# Patient Record
Sex: FEMALE | Race: WHITE | NOT HISPANIC OR LATINO | URBAN - METROPOLITAN AREA
[De-identification: names, ages, dates, MRNs, and addresses within clinical notes are randomized per-mention and may not be internally consistent; named-entity substitution may affect disease eponyms.]

---
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
## 2020-01-02 ENCOUNTER — EMERGENCY (EMERGENCY)
Facility: HOSPITAL | Age: 20
LOS: 1 days | Discharge: HOME | End: 2020-01-02
Attending: EMERGENCY MEDICINE | Admitting: EMERGENCY MEDICINE
Payer: SELF-PAY

## 2020-01-02 VITALS
TEMPERATURE: 98 F | HEART RATE: 91 BPM | OXYGEN SATURATION: 100 % | SYSTOLIC BLOOD PRESSURE: 112 MMHG | DIASTOLIC BLOOD PRESSURE: 75 MMHG | RESPIRATION RATE: 16 BRPM

## 2020-01-02 DIAGNOSIS — Z98.89 OTHER SPECIFIED POSTPROCEDURAL STATES: Chronic | ICD-10-CM

## 2020-01-02 LAB
APPEARANCE UR: CLEAR — SIGNIFICANT CHANGE UP
BILIRUB UR-MCNC: NEGATIVE — SIGNIFICANT CHANGE UP
BLOOD UR QL VISUAL: NEGATIVE — SIGNIFICANT CHANGE UP
COLOR SPEC: SIGNIFICANT CHANGE UP
GLUCOSE UR-MCNC: NEGATIVE — SIGNIFICANT CHANGE UP
HCG UR-SCNC: NEGATIVE — SIGNIFICANT CHANGE UP
KETONES UR-MCNC: NEGATIVE — SIGNIFICANT CHANGE UP
LEUKOCYTE ESTERASE UR-ACNC: NEGATIVE — SIGNIFICANT CHANGE UP
NITRITE UR-MCNC: NEGATIVE — SIGNIFICANT CHANGE UP
PH UR: 6.5 — SIGNIFICANT CHANGE UP (ref 5–8)
PROT UR-MCNC: NEGATIVE — SIGNIFICANT CHANGE UP
SP GR SPEC: 1.03 — SIGNIFICANT CHANGE UP (ref 1–1.04)
UROBILINOGEN FLD QL: NORMAL — SIGNIFICANT CHANGE UP

## 2020-01-02 PROCEDURE — 99283 EMERGENCY DEPT VISIT LOW MDM: CPT

## 2020-01-02 NOTE — ED ADULT TRIAGE NOTE - CHIEF COMPLAINT QUOTE
Pt. had argument with boyfriend and felt depressed and wanted to cut herself. Pt. with hx of depression, bipolar and anxiety. Pt. has been taking meds on/off.

## 2020-01-03 DIAGNOSIS — F31.9 BIPOLAR DISORDER, UNSPECIFIED: ICD-10-CM

## 2020-01-03 DIAGNOSIS — F60.3 BORDERLINE PERSONALITY DISORDER: ICD-10-CM

## 2020-01-03 DIAGNOSIS — F12.10 CANNABIS ABUSE, UNCOMPLICATED: ICD-10-CM

## 2020-01-03 LAB
AMPHET UR-MCNC: NEGATIVE — SIGNIFICANT CHANGE UP
BARBITURATES UR SCN-MCNC: NEGATIVE — SIGNIFICANT CHANGE UP
BENZODIAZ UR-MCNC: NEGATIVE — SIGNIFICANT CHANGE UP
CANNABINOIDS UR-MCNC: POSITIVE — SIGNIFICANT CHANGE UP
COCAINE METAB.OTHER UR-MCNC: NEGATIVE — SIGNIFICANT CHANGE UP
METHADONE UR-MCNC: NEGATIVE — SIGNIFICANT CHANGE UP
OPIATES UR-MCNC: NEGATIVE — SIGNIFICANT CHANGE UP
OXYCODONE UR-MCNC: NEGATIVE — SIGNIFICANT CHANGE UP
PCP UR-MCNC: NEGATIVE — SIGNIFICANT CHANGE UP

## 2020-01-03 PROCEDURE — 90792 PSYCH DIAG EVAL W/MED SRVCS: CPT | Mod: GC

## 2020-01-03 NOTE — ED BEHAVIORAL HEALTH ASSESSMENT NOTE - RISK ASSESSMENT
Patient is at chronic risk marked by substance abuse, prior suicidality, and recent reported suicide attempt. Her protective factors of ongoing outpatient care and social supports, no access to firearms, future oriented, treatment seeking and currently in treatment right now.  Patient denies acute suicidal ideation, reports chronic passive suicidal ideation, however Low Acute Suicide Risk Patient is at chronic risk marked by substance abuse, prior suicidality, and recent reported suicide attempt. Her protective factors of ongoing outpatient care and social supports, no access to firearms, future oriented, treatment seeking and currently in treatment right now.  Patient denies acute suicidal ideation, reports chronic passive suicidal ideation, patient deferring voluntary psychiatric hospitalization, patient does not meet criteria for involuntary hospitalization. Patient to be discharged and continue outpatient treatment. Pt given information of [facility] center and to call 911/ER should worsening suicidal ideation. Patient is at chronic risk marked by hx of chronic SI, cluster B traits, substance abuse, prior suicidality, and recent aborted suicide attempt. Protective factors of ongoing outpatient care and social supports, no access to firearms, future oriented, treatment seeking and currently in treatment. Patient denies acute suicidal ideation, reports chronic passive suicidal ideation, patient deferring voluntary psychiatric hospitalization, patient does not meet criteria for involuntary hospitalization. Pt given information of [facility] and to call 911/ER should worsening suicidal ideation, patient to follow up with outpatient psychiatrist Dr. Rafi Vizcaino. Patient is at chronic risk marked by hx of chronic SI, cluster B traits, substance abuse, prior suicidality, and recent aborted suicide attempt. Protective factors of ongoing outpatient care and social supports, no access to firearms, future oriented, treatment seeking and currently in treatment. Patient denies acute suicidal ideation, reports chronic passive suicidal ideation, no intent or plan ; patient deferring voluntary psychiatric hospitalization, patient does not meet criteria for involuntary hospitalization. Pt given information of [facility] and to call 911/ER should worsening suicidal ideation, patient to follow up with outpatient psychiatrist Dr. Rafi Vizcaino.

## 2020-01-03 NOTE — ED BEHAVIORAL HEALTH ASSESSMENT NOTE - DESCRIPTION (FIRST USE, LAST USE, QUANTITY, FREQUENCY, DURATION)
vaping every other day History of remote Xanax abuse since age 13 off and on as per the patient in the past

## 2020-01-03 NOTE — ED BEHAVIORAL HEALTH ASSESSMENT NOTE - OTHER
Smith De La Garza CVM [employer] denies delusions, denies suicidal or homicidal ideation missed her last appointment and does not take the Seroquel lately PD

## 2020-01-03 NOTE — ED PROVIDER NOTE - PATIENT PORTAL LINK FT
You can access the FollowMyHealth Patient Portal offered by [facility] by registering at the following website: [email or web address]. By joining [facility]’s FollowMyHealth portal, you will also be able to view your health information using other applications (apps) compatible with our system.

## 2020-01-03 NOTE — ED BEHAVIORAL HEALTH ASSESSMENT NOTE - ORIENTED TO PLACE
Patient with acute exacerbation of asthma with asthmatic bronchitis, most likely due to acute influenza A.   Will place patient on Solumedrol and Duoneb.  Continue Symbicort and Spiriva.   Pulmonary follow up.   Further management as per patient's clinical course. Yes

## 2020-01-03 NOTE — ED PROVIDER NOTE - PROGRESS NOTE DETAILS
Oscar HUBBARD: Pt signed out to me.  She has been evaluated by psychiatry, cleared for discharge home.

## 2020-01-03 NOTE — ED PROVIDER NOTE - CARE PLAN
Principal Discharge DX:	Bipolar affect, depressed  Secondary Diagnosis:	Borderline personality disorder

## 2020-01-03 NOTE — ED BEHAVIORAL HEALTH ASSESSMENT NOTE - SAFETY PLAN ADDT'L DETAILS
Education provided regarding environmental safety / lethal means restriction/Provision of National Suicide Prevention Lifeline 5-419-136-TMCP (2352)/Safety plan discussed with...

## 2020-01-03 NOTE — ED PROVIDER NOTE - CLINICAL SUMMARY MEDICAL DECISION MAKING FREE TEXT BOX
This is a 19 yr old F, pmh anxiety, depression, bipolar disorder, and borderline personality disorder with c/o increased agitation and SI, auditory hallucination. Ua tox and pregnancy profile. Psychiatric consult requested. There is no clinical evidence of intoxication, or any acute medical problem requiring immediate intervention.
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
## 2020-01-03 NOTE — ED PROVIDER NOTE - OBJECTIVE STATEMENT
This is a 19 yr old F, pmh anxiety, depression, bipolar disorder, and borderline personality disorder with c/o increased agitation and SI, auditory hallucination. Bib by ems after fighting with boyfriend. Pt states everything started like the self injuries behavior (cutting)  with her step dad , she "hates him". Endorses today she was irritable and everything was bothering her. She feels need hospitalization because her medication not working for her. Endorses intermittent compliance with medication. Pt reports last psychiatric hospitalization in 2017.

## 2020-01-03 NOTE — ED BEHAVIORAL HEALTH ASSESSMENT NOTE - SUICIDE RISK FACTORS
Alcohol/Substance abuse disorders/Impulsivity/History of abuse/trauma/Cluster B Personality disorders or traits current/past

## 2020-01-03 NOTE — ED BEHAVIORAL HEALTH ASSESSMENT NOTE - REFERRAL / APPOINTMENT DETAILS
Patient instructed to make appointment with outpatient psychiatrist Dr. Vizcaino, Contacted outpatient psychiatrist, pt has appointment at [facility] on 1/3/19 at 1pm with Dr. Rafi Vizcaino

## 2020-01-03 NOTE — ED BEHAVIORAL HEALTH ASSESSMENT NOTE - SUICIDE PROTECTIVE FACTORS
Supportive social network of family or friends/Positive therapeutic relationships/Responsibility to family and others/Identifies reasons for living/Has future plans
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
## 2020-01-03 NOTE — ED BEHAVIORAL HEALTH ASSESSMENT NOTE - TREATMENT
[facility]

## 2020-01-03 NOTE — ED BEHAVIORAL HEALTH ASSESSMENT NOTE - OTHER PAST PSYCHIATRIC HISTORY (INCLUDE DETAILS REGARDING ONSET, COURSE OF ILLNESS, INPATIENT/OUTPATIENT TREATMENT)
Inpatient: Hx of multiple psychiatric hospitalizations, reports at least 6. Last impatient in August 2015 for worsening mood symptoms and increased substance abuse, April 2015 at [facility] s/p suicide ideation with plans of suffocating her self with bag and March 2014 after suicide ideation with plan to hang herself. She had bought rope and mom found it in closet.     Outpatient:  Current psychiatrist: Dr. Charis DOVER [facility]    Current therapist: does not have one currently Inpatient: Hx of multiple psychiatric hospitalizations, reports at least 6. Last impatient in August 2015 for worsening mood symptoms and increased substance abuse, April 2015 at [facility] s/p suicide ideation with plans of suffocating her self with bag and March 2014 after suicide ideation with plan to hang herself. She had bought rope and mom found it in closet. She [facility] due to PCP referral "for being emotionally unstable", missed her f/u appointment ; [facility] in [facility], pt states that she is referred 'to adult outpatient clinic now"      Outpatient:  Current psychiatrist: Dr. Charis [facility]    Current therapist: does not have one currently

## 2020-01-03 NOTE — ED ADULT NURSE REASSESSMENT NOTE - NS ED NURSE REASSESS COMMENT FT1
Pt sitting on chairs, calm and cooperative, denies SI/HI at the moment. Respirations even/unlabored, nad noted. will continue to monitor

## 2020-01-03 NOTE — ED BEHAVIORAL HEALTH ASSESSMENT NOTE - DESCRIPTION
Pt calm and cooperative.     Vital Signs Last 24 Hrs  T(C): 36.8 (02 Jan 2020 22:19), Max: 36.8 (02 Jan 2020 22:19)  T(F): 98.2 (02 Jan 2020 22:19), Max: 98.2 (02 Jan 2020 22:19)  HR: 91 (02 Jan 2020 22:19) (91 - 91)  BP: 112/75 (02 Jan 2020 22:19) (112/75 - 112/75)  BP(mean): --  RR: 16 (02 Jan 2020 22:19) (16 - 16)  SpO2: 100% (02 Jan 2020 22:19) (100% - 100%) Denies, no known drug allergies Lives with boyfriend and friend currently. Currently unemployed. Dropped out of college, planning on becoming a nurse

## 2020-01-03 NOTE — ED BEHAVIORAL HEALTH ASSESSMENT NOTE - SUMMARY
Patient does not present with any suicidal, homicidal, depression, or self-harm behaviors.  She also does not present with any psychotic or manic symptoms.  There is no acute reason to hospitalize the patient and she will be discharged home. 18yo F domiciled with boyfriend, dropped out of [employer] spring 2019, unemployed, with history of bipolar disorder and r/o borderline personality disorder, no known medical problems, Hx of SIB, history of cannabis substance use, daily vaping nicotine, remote history of alcohol and BNZ abuse, currently in treatment [facility] with Dr. Vizcaino (last seen on 12/4/19), hx of at least 6 inpatient psychiatric hospitalizations (most recent in 2015), with history of several suicide attempts (aborted attempt via hanging 11/20/19), who presents today on referral from new primary care doctor who presents to the ED after being BIB EMS activated by self for suicidal ideation.     Patient reports that she impulsively called 911 and currently denies having any suicidal thoughts. Reports that she had urges to self-harm after argument with boyfriend and made phone call to 911 in retaliation. Feels remorseful and regretful at time. Patient does not present with any suicidal, homicidal, depression, or self-harm behaviors.  She also does not present with any psychotic or manic symptoms. Patient does not require inpatient hospitalization at this time, able to safety plan and discharged home with close outpatient follow up. 18yo F domiciled with boyfriend, dropped out of [employer] spring 2019, unemployed, with history of bipolar disorder and r/o borderline personality disorder, no known medical problems, Hx of SIB, history of cannabis substance use, daily vaping nicotine, remote history of alcohol and BNZ abuse, currently in treatment [facility] with Dr. Vizcaino (last seen on 12/4/19), hx of at least 6 inpatient psychiatric hospitalizations (most recent in 2015), with history of several suicide attempts (aborted attempt via hanging 11/20/19), who presents today on referral from new primary care doctor who presents to the ED after being BIB EMS activated by self for suicidal ideation.     Patient reports that she impulsively called 911 and currently denies having any suicidal thoughts. Reports that she had urges to self-harm after argument with boyfriend and made phone call to 911 in retaliation. Feels remorseful and regretful at time. Patient does not present with any suicidal, homicidal, depression, or self-harm behaviors.  She also does not present with any psychotic or manic symptoms. Denies any urges to cut herself at present time. Denies feeling depressed . She wants to be d/ramakrishna. Patient does not meet criteria for involuntary hospitalization, able to safety plan and discharged home with close outpatient follow up.

## 2020-01-04 NOTE — ED BEHAVIORAL HEALTH NOTE - BEHAVIORAL HEALTH NOTE
Per High Risk Log, SW attempted to contact patient at [phone] two times and received message both times that "The number you have dialed is not a working number".

## 2020-01-05 NOTE — ED BEHAVIORAL HEALTH NOTE - BEHAVIORAL HEALTH NOTE
Per high risk log, SW attempted to contact patient at [phone] received message that "The number you have dialed is not a working number"

## 2020-07-29 ENCOUNTER — EMERGENCY (EMERGENCY)
Age: 20
LOS: 1 days | Discharge: HOME | End: 2020-07-29
Attending: EMERGENCY MEDICINE | Admitting: EMERGENCY MEDICINE
Payer: COMMERCIAL

## 2020-07-29 VITALS
HEART RATE: 120 BPM | DIASTOLIC BLOOD PRESSURE: 70 MMHG | SYSTOLIC BLOOD PRESSURE: 112 MMHG | TEMPERATURE: 99 F | RESPIRATION RATE: 24 BRPM | OXYGEN SATURATION: 98 %

## 2020-07-29 VITALS
HEART RATE: 99 BPM | SYSTOLIC BLOOD PRESSURE: 115 MMHG | OXYGEN SATURATION: 100 % | RESPIRATION RATE: 16 BRPM | TEMPERATURE: 98 F | DIASTOLIC BLOOD PRESSURE: 68 MMHG

## 2020-07-29 DIAGNOSIS — Z98.89 OTHER SPECIFIED POSTPROCEDURAL STATES: Chronic | ICD-10-CM

## 2020-07-29 LAB
APPEARANCE UR: SIGNIFICANT CHANGE UP
BACTERIA # UR AUTO: HIGH
BILIRUB UR-MCNC: NEGATIVE — SIGNIFICANT CHANGE UP
BLOOD UR QL VISUAL: HIGH
COLOR SPEC: YELLOW — SIGNIFICANT CHANGE UP
GLUCOSE UR-MCNC: NEGATIVE — SIGNIFICANT CHANGE UP
HYALINE CASTS # UR AUTO: HIGH
KETONES UR-MCNC: SIGNIFICANT CHANGE UP
LEUKOCYTE ESTERASE UR-ACNC: SIGNIFICANT CHANGE UP
NITRITE UR-MCNC: POSITIVE — HIGH
PH UR: 6 — SIGNIFICANT CHANGE UP (ref 5–8)
PROT UR-MCNC: 50 — SIGNIFICANT CHANGE UP
RBC CASTS # UR COMP ASSIST: SIGNIFICANT CHANGE UP (ref 0–?)
SP GR SPEC: 1.03 — SIGNIFICANT CHANGE UP (ref 1–1.04)
SQUAMOUS # UR AUTO: SIGNIFICANT CHANGE UP
UROBILINOGEN FLD QL: NORMAL — SIGNIFICANT CHANGE UP
WBC UR QL: HIGH (ref 0–?)

## 2020-07-29 PROCEDURE — 99283 EMERGENCY DEPT VISIT LOW MDM: CPT
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
## 2020-07-29 NOTE — ED PROVIDER NOTE - PHYSICAL EXAMINATION
GA: tearful any time she speaks in no acute distress  Skin: multiple bruises in her legs, old abrasions on bilat inner lower legs which she states is from a fall a few days ago.   Multiple 4 cm well healed scars on L forearm. GA: tearful any time she speaks in no acute distress  Skin: multiple bruises in her legs, old abrasions on bilat inner lower legs which she states is from a fall a few days ago.   Multiple 4 cm well healed scars on L forearm. Abrasion to back of shoulder on the R side and bruising. GA: tearful any time she speaks in no acute distress  Skin: multiple bruises in her legs, old abrasions on bilat inner lower legs which she states is from a fall a few days ago.   Multiple 4 cm well healed scars on L forearm. Abrasion to back of shoulder on the R side and bruising.  ATTENDING PHYSICAL EXAM  GEN - Upset about being in ED.  Answers only few questions in HPI with repeated encouragement  HEAD - NC/AT  NECK: Neck supple  PULMONARY - CTA b/l, symmetric breath sounds  CARDIAC -s1s2, RRR, no M,R,G  ABDOMEN - +BS, ND, NT  BACK - no CVA tenderness  EXTREMITIES - noted multiple areas of abrasions and ecchymosis on b/l upper and lower extremities.  Patient not allowing close examination.
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
## 2020-07-29 NOTE — ED PROVIDER NOTE - NSFOLLOWUPINSTRUCTIONS_ED_ALL_ED_FT
Please follow up tomorrow if you would like any further examination. Follow all instructions as discussed verbally.
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
## 2020-07-30 ENCOUNTER — EMERGENCY (EMERGENCY)
Facility: HOSPITAL | Age: 20
LOS: 1 days | Discharge: HOME | End: 2020-07-30
Attending: EMERGENCY MEDICINE | Admitting: EMERGENCY MEDICINE
Payer: COMMERCIAL

## 2020-07-30 VITALS
DIASTOLIC BLOOD PRESSURE: 74 MMHG | RESPIRATION RATE: 16 BRPM | HEART RATE: 75 BPM | OXYGEN SATURATION: 100 % | TEMPERATURE: 98 F | SYSTOLIC BLOOD PRESSURE: 130 MMHG

## 2020-07-30 VITALS
TEMPERATURE: 97 F | HEART RATE: 113 BPM | DIASTOLIC BLOOD PRESSURE: 83 MMHG | SYSTOLIC BLOOD PRESSURE: 123 MMHG | OXYGEN SATURATION: 100 % | RESPIRATION RATE: 16 BRPM

## 2020-07-30 DIAGNOSIS — Z98.89 OTHER SPECIFIED POSTPROCEDURAL STATES: Chronic | ICD-10-CM

## 2020-07-30 LAB
ALBUMIN SERPL ELPH-MCNC: 4.4 G/DL — SIGNIFICANT CHANGE UP (ref 3.3–5)
ALP SERPL-CCNC: 85 U/L — SIGNIFICANT CHANGE UP (ref 40–120)
ALT FLD-CCNC: 26 U/L — SIGNIFICANT CHANGE UP (ref 4–33)
ANION GAP SERPL CALC-SCNC: 14 MMO/L — SIGNIFICANT CHANGE UP (ref 7–14)
AST SERPL-CCNC: 34 U/L — HIGH (ref 4–32)
BILIRUB SERPL-MCNC: 0.6 MG/DL — SIGNIFICANT CHANGE UP (ref 0.2–1.2)
BUN SERPL-MCNC: 10 MG/DL — SIGNIFICANT CHANGE UP (ref 7–23)
CALCIUM SERPL-MCNC: 8.8 MG/DL — SIGNIFICANT CHANGE UP (ref 8.4–10.5)
CHLORIDE SERPL-SCNC: 103 MMOL/L — SIGNIFICANT CHANGE UP (ref 98–107)
CO2 SERPL-SCNC: 23 MMOL/L — SIGNIFICANT CHANGE UP (ref 22–31)
CREAT SERPL-MCNC: 0.6 MG/DL — SIGNIFICANT CHANGE UP (ref 0.5–1.3)
GLUCOSE SERPL-MCNC: 97 MG/DL — SIGNIFICANT CHANGE UP (ref 70–99)
HBV SURFACE AG SER-ACNC: NEGATIVE — SIGNIFICANT CHANGE UP
HCG SERPL-ACNC: < 5 MIU/ML — SIGNIFICANT CHANGE UP
HCT VFR BLD CALC: 41.3 % — SIGNIFICANT CHANGE UP (ref 34.5–45)
HGB BLD-MCNC: 13.2 G/DL — SIGNIFICANT CHANGE UP (ref 11.5–15.5)
HIV COMBO RESULT: SIGNIFICANT CHANGE UP
HIV1+2 AB SPEC QL: SIGNIFICANT CHANGE UP
MCHC RBC-ENTMCNC: 28.5 PG — SIGNIFICANT CHANGE UP (ref 27–34)
MCHC RBC-ENTMCNC: 32 % — SIGNIFICANT CHANGE UP (ref 32–36)
MCV RBC AUTO: 89.2 FL — SIGNIFICANT CHANGE UP (ref 80–100)
NRBC # FLD: 0 K/UL — SIGNIFICANT CHANGE UP (ref 0–0)
PLATELET # BLD AUTO: 266 K/UL — SIGNIFICANT CHANGE UP (ref 150–400)
PMV BLD: 10 FL — SIGNIFICANT CHANGE UP (ref 7–13)
POTASSIUM SERPL-MCNC: 3.4 MMOL/L — LOW (ref 3.5–5.3)
POTASSIUM SERPL-SCNC: 3.4 MMOL/L — LOW (ref 3.5–5.3)
PROT SERPL-MCNC: 7.2 G/DL — SIGNIFICANT CHANGE UP (ref 6–8.3)
RBC # BLD: 4.63 M/UL — SIGNIFICANT CHANGE UP (ref 3.8–5.2)
RBC # FLD: 12.3 % — SIGNIFICANT CHANGE UP (ref 10.3–14.5)
SODIUM SERPL-SCNC: 140 MMOL/L — SIGNIFICANT CHANGE UP (ref 135–145)
WBC # BLD: 9.14 K/UL — SIGNIFICANT CHANGE UP (ref 3.8–10.5)
WBC # FLD AUTO: 9.14 K/UL — SIGNIFICANT CHANGE UP (ref 3.8–10.5)

## 2020-07-30 PROCEDURE — 99283 EMERGENCY DEPT VISIT LOW MDM: CPT

## 2020-07-30 RX ORDER — AZITHROMYCIN 500 MG/1
1000 TABLET, FILM COATED ORAL ONCE
Refills: 0 | Status: COMPLETED | OUTPATIENT
Start: 2020-07-30 | End: 2020-07-30

## 2020-07-30 RX ORDER — CEFTRIAXONE 500 MG/1
250 INJECTION, POWDER, FOR SOLUTION INTRAMUSCULAR; INTRAVENOUS ONCE
Refills: 0 | Status: COMPLETED | OUTPATIENT
Start: 2020-07-30 | End: 2020-07-30

## 2020-07-30 RX ADMIN — CEFTRIAXONE 250 MILLIGRAM(S): 500 INJECTION, POWDER, FOR SOLUTION INTRAMUSCULAR; INTRAVENOUS at 21:33

## 2020-07-30 RX ADMIN — AZITHROMYCIN 1000 MILLIGRAM(S): 500 TABLET, FILM COATED ORAL at 21:33
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
## 2020-07-30 NOTE — ED PROVIDER NOTE - PROGRESS NOTE DETAILS
Lynda Sen MD: SW spoke to pt regarding transport home, pt willing to take Uber ride home. Lynda Sen MD: SANE RN arrived to ED. States that pt requesting for STD ppx. Will give IM ceftriaxone and PO azithromycin. Cynthia HBUBARD PGY-5:  Received signout on patient. Seen by sane nurse. samples taken. meds will be given and patient is stable for dc home.

## 2020-07-30 NOTE — PROVIDER CONTACT NOTE (OTHER) - ASSESSMENT
I was asked by Provider to see pt regarding Insurance and transportation . Met with pt, she is alert, oriented x3. Pt asked me if her Insurance is active. I checked with the registration and was told  she has BC/BS active. Notified pt. Then she said she will take Uber to go home. No further SW services was requested.

## 2020-07-30 NOTE — ED ADULT NURSE NOTE - IN THE PAST 12 MONTHS HAVE YOU USED DRUGS OTHER THAN THOSE REQUIRED FOR MEDICAL REASON?
Mom called and said the prescription date is wrong and needs it to be changed from April 21 to February 21  Please give her a call when this is completed No
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
## 2020-07-30 NOTE — ED PROVIDER NOTE - PATIENT PORTAL LINK FT
You can access the FollowMyHealth Patient Portal offered by [facility] by registering at the following website: [email or web address]. By joining [employer]’s FollowMyHealth portal, you will also be able to view your health information using other applications (apps) compatible with our system.
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
## 2020-07-30 NOTE — ED ADULT NURSE NOTE - HIV OFFER
I have attempted without success to contact this patient by phone lvm   Previously Declined (within the last year)
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
## 2020-07-30 NOTE — ED PROVIDER NOTE - PHYSICAL EXAMINATION
CONSTITUTIONAL: Nontoxic, anxious appearing female  HEAD: Normocephalic; atraumatic  EYES: Normal inspection, EOMI  ENMT: External appears normal  NECK: Supple  CARD: RRR; no audible murmurs, rubs, or gallops  RESP: No respiratory distress, lungs ctab/l  ABD: Soft, non-distended; non-tender; no rebound or guarding  EXT: No LE pitting edema or calf tenderness; distal pulses intact with good capillary refill  SKIN: Warm, dry, intact  NEURO: aaox3, moving all extremities spontaneously  : deferred to GERSON MCQUEEN

## 2020-07-30 NOTE — ED ADULT NURSE REASSESSMENT NOTE - NS ED NURSE REASSESS COMMENT FT1
GERSON RN at bedside. medication orders in place. [place] [employer]  Alison called, left phone number [phone].

## 2020-07-31 LAB
C TRACH RRNA SPEC QL NAA+PROBE: SIGNIFICANT CHANGE UP
CULTURE RESULTS: SIGNIFICANT CHANGE UP
HAV IGM SER-ACNC: NONREACTIVE — SIGNIFICANT CHANGE UP
HBV CORE AB SER-ACNC: NONREACTIVE — SIGNIFICANT CHANGE UP
HBV CORE IGM SER-ACNC: NONREACTIVE — SIGNIFICANT CHANGE UP
HBV SURFACE AB SER-ACNC: NONREACTIVE — SIGNIFICANT CHANGE UP
HBV SURFACE AG SER-ACNC: NONREACTIVE — SIGNIFICANT CHANGE UP
HCV AB S/CO SERPL IA: 0.22 S/CO — SIGNIFICANT CHANGE UP (ref 0–0.99)
HCV AB SERPL-IMP: SIGNIFICANT CHANGE UP
N GONORRHOEA RRNA SPEC QL NAA+PROBE: SIGNIFICANT CHANGE UP
SPECIMEN SOURCE: SIGNIFICANT CHANGE UP
T PALLIDUM AB TITR SER: NEGATIVE — SIGNIFICANT CHANGE UP

## 2020-08-02 NOTE — ED POST DISCHARGE NOTE - RESULT SUMMARY
culture grew 3 or more types of organisms  which indicate collection contamination, consider recollection only if clinically indicated. No antibiotic listed in ED provider note or prescription writer at time of discharge. UA Positive. Patient contact # [phone] " person not accepting calls at this time call again later". Call Back  PA to continue to try and contact patient.

## 2020-11-16 ENCOUNTER — EMERGENCY (EMERGENCY)
Facility: HOSPITAL | Age: 20
LOS: 1 days | Discharge: HOME | End: 2020-11-16
Attending: STUDENT IN AN ORGANIZED HEALTH CARE EDUCATION/TRAINING PROGRAM | Admitting: STUDENT IN AN ORGANIZED HEALTH CARE EDUCATION/TRAINING PROGRAM
Payer: COMMERCIAL

## 2020-11-16 VITALS
RESPIRATION RATE: 18 BRPM | DIASTOLIC BLOOD PRESSURE: 63 MMHG | OXYGEN SATURATION: 100 % | SYSTOLIC BLOOD PRESSURE: 117 MMHG | HEART RATE: 99 BPM

## 2020-11-16 VITALS
RESPIRATION RATE: 16 BRPM | DIASTOLIC BLOOD PRESSURE: 70 MMHG | HEART RATE: 120 BPM | TEMPERATURE: 99 F | OXYGEN SATURATION: 100 % | SYSTOLIC BLOOD PRESSURE: 126 MMHG

## 2020-11-16 DIAGNOSIS — Z98.89 OTHER SPECIFIED POSTPROCEDURAL STATES: Chronic | ICD-10-CM

## 2020-11-16 PROCEDURE — 99283 EMERGENCY DEPT VISIT LOW MDM: CPT

## 2020-11-16 RX ORDER — LIDOCAINE 4 G/100G
1 CREAM TOPICAL ONCE
Refills: 0 | Status: COMPLETED | OUTPATIENT
Start: 2020-11-16 | End: 2020-11-16

## 2020-11-16 RX ORDER — ACETAMINOPHEN 500 MG
650 TABLET ORAL ONCE
Refills: 0 | Status: COMPLETED | OUTPATIENT
Start: 2020-11-16 | End: 2020-11-16

## 2020-11-16 RX ADMIN — LIDOCAINE 1 PATCH: 4 CREAM TOPICAL at 16:04

## 2020-11-16 RX ADMIN — Medication 650 MILLIGRAM(S): at 16:04

## 2020-11-16 NOTE — ED PROVIDER NOTE - PATIENT PORTAL LINK FT
You can access the FollowMyHealth Patient Portal offered by [facility] by registering at the following website: [email or web address]. By joining [employer]’s FollowMyHealth portal, you will also be able to view your health information using other applications (apps) compatible with our system.
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
## 2020-11-16 NOTE — ED PROVIDER NOTE - NSFOLLOWUPINSTRUCTIONS_ED_ALL_ED_FT
Back Pain    Back pain is very common in adults. The cause of back pain is rarely dangerous and the pain often gets better over time. The cause of your back pain may not be known and may include strain of muscles or ligaments, degeneration of the spinal disks, or arthritis. Occasionally the pain may radiate down your leg(s). Over-the-counter medicines to reduce pain and inflammation are often the most helpful. Stretching and remaining active frequently helps the healing process.     Low Back Strain  A strain is a stretch or tear in a muscle or the strong cords of tissue that attach muscle to bone (tendons). Strains of the lower back (lumbar spine) are a common cause of low back pain. A strain occurs when muscles or tendons are torn or are stretched beyond their limits. The muscles may become inflamed, resulting in pain and sudden muscle tightening (spasms). A strain can happen suddenly due to an injury (trauma), or it can develop gradually due to overuse.    What increases the risk?  The following factors may increase your risk of getting this condition:  Playing contact sports.  Participating in sports or activities that put excessive stress on the back and require a lot of bending and twisting, including:  Lifting weights or heavy objects, Gymnastics, Soccer, Figure skating, Snowboarding, Being overweight or obese, Having poor strength and flexibility.    What are the signs or symptoms?  Symptoms of this condition may include:  Sharp or dull pain in the lower back that does not go away. Pain may extend to the buttocks.  Stiffness.  Limited range of motion.  Inability to stand up straight due to stiffness or pain.  Muscle spasms.    How is this diagnosed?  This condition may be diagnosed based on:  Your symptoms, Your medical history, A physical exam, Your health care provider may push on certain areas of your back to determine the source of your pain. You may be asked to bend forward, backward, and side to side to assess the severity of your pain and your range of motion.  Imaging tests, such as:  X-rays, MRI.    How is this treated?  Treatment for this condition may include:  Applying heat and cold to the affected area.  Medicines to help relieve pain and to relax your muscles (muscle relaxants).  NSAIDs to help reduce swelling and discomfort.  Physical therapy.  When your symptoms improve, it is important to gradually return to your normal routine as soon as possible to reduce pain, avoid stiffness, and avoid loss of muscle strength. Generally, symptoms should improve within 6 weeks of treatment. However, recovery time varies.    Follow these instructions at home:  Managing pain, stiffness, and swelling     If directed, apply ice to the injured area during the first 24 hours after your injury.  Put ice in a plastic bag.  Place a towel between your skin and the bag.  Leave the ice on for 20 minutes, 2–3 times a day.  If directed, apply heat to the affected area as often as told by your health care provider. Use the heat source that your health care provider recommends, such as a moist heat pack or a heating pad.  Place a towel between your skin and the heat source.  Leave the heat on for 20–30 minutes.  Remove the heat if your skin turns bright red. This is especially important if you are unable to feel pain, heat, or cold. You may have a greater risk of getting burned.  Activity     Rest and return to your normal activities as told by your health care provider. Ask your health care provider what activities are safe for you.  Avoid activities that take a lot of effort (are strenuous) for as long as told by your health care provider.  Do exercises as told by your health care provider.  General instructions     Take over-the-counter and prescription medicines only as told by your health care provider.  If you have questions or concerns about safety while taking pain medicine, talk with your health care provider.  Do not drive or operate heavy machinery until you know how your pain medicine affects you.  Do not use any tobacco products, such as cigarettes, chewing tobacco, and e-cigarettes. Tobacco can delay bone healing. If you need help quitting, ask your health care provider.  Keep all follow-up visits as told by your health care provider. This is important.    How is this prevented?  Warm up and stretch before being active.  Cool down and stretch after being active.  Give your body time to rest between periods of activity.  Avoid:  Being physically inactive for long periods at a time.  Exercising or playing sports when you are tired or in pain.  Use correct form when playing sports and lifting heavy objects.  Use good posture when sitting and standing.  Maintain a healthy weight.  Sleep on a mattress with medium firmness to support your back.  Make sure to use equipment that fits you, including shoes that fit well.  Be safe and responsible while being active to avoid falls.  Do at least 150 minutes of moderate-intensity exercise each week, such as brisk walking or water aerobics. Try a form of exercise that takes stress off your back, such as swimming or stationary cycling.  Maintain physical fitness, including:  Strength.  Flexibility.  Cardiovascular fitness.  Endurance.  Contact a health care provider if:  Your back pain does not improve after 6 weeks of treatment.  Your symptoms get worse.  Get help right away if:  Your back pain is severe.  You are unable to stand or walk.  You develop pain in your legs.  You develop weakness in your buttocks or legs.  You have difficulty controlling when you urinate or when you have a bowel movement.  This information is not intended to replace advice given to you by your health care provider. Make sure you discuss any questions you have with your health care provider.      SEEK IMMEDIATE MEDICAL CARE IF YOU HAVE ANY OF THE FOLLOWING SYMPTOMS: bowel or bladder control problems, unusual weakness or numbness in your arms or legs, nausea or vomiting, abdominal pain, fever, dizziness/lightheadedness.

## 2020-11-16 NOTE — ED ADULT NURSE NOTE - OBJECTIVE STATEMENT
break RN - pt received in intake A&Ox4 c/o fall. Pt states she was walking down steps quickly and was wearing socks and slipped down multiple steps. Denies LOC, states she "thinks" she hit head. Small abrasion noted on back. No bleeding noted. C/o R elbow and R lower back pain. Denies SOB or trouble breathing, ambulatory but difficulty d/t pain. Medicated as per EMAR. Will continue to monitor.

## 2020-11-16 NOTE — ED PROVIDER NOTE - PROGRESS NOTE DETAILS
Patient has mild relief of pain after pain medication. Patient no longer tachycardiac; 93 on monitor

## 2020-11-16 NOTE — ED PROVIDER NOTE - ATTENDING CONTRIBUTION TO CARE
Oscar HUBBARD: I agree with the above provided history and exam     I Kee Moore MD performed a history and physical exam of the patient and discussed their management with the resident and /or advanced care provider. I reviewed the resident and /or ACP's note and agree with the documented findings and plan of care. My medical decision making and observations are found above.

## 2020-11-16 NOTE — ED PROVIDER NOTE - CLINICAL SUMMARY MEDICAL DECISION MAKING FREE TEXT BOX
20F w/ back pain s/p mechanical slip and fall down several stairs - low mechanism, exam w/out midline spinal tenderness, no red flags for spinal injury, will give pain meds, revital, reassess

## 2021-02-09 ENCOUNTER — OUTPATIENT (OUTPATIENT)
Dept: OUTPATIENT SERVICES | Facility: HOSPITAL | Age: 21
LOS: 1 days | Discharge: STILL ADMITTED OR ADMINISTRATIVE | End: 2021-02-09

## 2021-02-09 DIAGNOSIS — Z98.89 OTHER SPECIFIED POSTPROCEDURAL STATES: Chronic | ICD-10-CM

## 2021-02-09 PROCEDURE — 90839 PSYTX CRISIS INITIAL 60 MIN: CPT

## 2021-03-15 ENCOUNTER — OUTPATIENT (OUTPATIENT)
Dept: OUTPATIENT SERVICES | Facility: HOSPITAL | Age: 21
LOS: 1 days | Discharge: LEFT BEFORE DISCHARGE | End: 2021-03-15
Payer: COMMERCIAL

## 2021-03-15 DIAGNOSIS — Z98.89 OTHER SPECIFIED POSTPROCEDURAL STATES: Chronic | ICD-10-CM

## 2021-03-25 DIAGNOSIS — F43.10 POST-TRAUMATIC STRESS DISORDER, UNSPECIFIED: ICD-10-CM

## 2021-03-25 DIAGNOSIS — F31.9 BIPOLAR DISORDER, UNSPECIFIED: ICD-10-CM

## 2022-02-06 ENCOUNTER — EMERGENCY (EMERGENCY)
Facility: HOSPITAL | Age: 22
LOS: 1 days | Discharge: HOME | End: 2022-02-06
Attending: EMERGENCY MEDICINE | Admitting: EMERGENCY MEDICINE
Payer: COMMERCIAL

## 2022-02-06 VITALS
TEMPERATURE: 97 F | HEIGHT: 62 IN | OXYGEN SATURATION: 100 % | DIASTOLIC BLOOD PRESSURE: 75 MMHG | RESPIRATION RATE: 18 BRPM | SYSTOLIC BLOOD PRESSURE: 122 MMHG | HEART RATE: 81 BPM

## 2022-02-06 VITALS
HEART RATE: 66 BPM | RESPIRATION RATE: 18 BRPM | SYSTOLIC BLOOD PRESSURE: 109 MMHG | DIASTOLIC BLOOD PRESSURE: 71 MMHG | OXYGEN SATURATION: 100 %

## 2022-02-06 DIAGNOSIS — Z98.89 OTHER SPECIFIED POSTPROCEDURAL STATES: Chronic | ICD-10-CM

## 2022-02-06 LAB
ALBUMIN SERPL ELPH-MCNC: 4.9 G/DL — SIGNIFICANT CHANGE UP (ref 3.3–5)
ALP SERPL-CCNC: 68 U/L — SIGNIFICANT CHANGE UP (ref 40–120)
ALT FLD-CCNC: 17 U/L — SIGNIFICANT CHANGE UP (ref 4–33)
ANION GAP SERPL CALC-SCNC: 16 MMOL/L — HIGH (ref 7–14)
APPEARANCE UR: ABNORMAL
AST SERPL-CCNC: 23 U/L — SIGNIFICANT CHANGE UP (ref 4–32)
BACTERIA # UR AUTO: ABNORMAL
BASOPHILS # BLD AUTO: 0.08 K/UL — SIGNIFICANT CHANGE UP (ref 0–0.2)
BASOPHILS NFR BLD AUTO: 0.7 % — SIGNIFICANT CHANGE UP (ref 0–2)
BILIRUB SERPL-MCNC: 0.7 MG/DL — SIGNIFICANT CHANGE UP (ref 0.2–1.2)
BILIRUB UR-MCNC: NEGATIVE — SIGNIFICANT CHANGE UP
BUN SERPL-MCNC: 19 MG/DL — SIGNIFICANT CHANGE UP (ref 7–23)
CALCIUM SERPL-MCNC: 10.2 MG/DL — SIGNIFICANT CHANGE UP (ref 8.4–10.5)
CHLORIDE SERPL-SCNC: 97 MMOL/L — LOW (ref 98–107)
CO2 SERPL-SCNC: 24 MMOL/L — SIGNIFICANT CHANGE UP (ref 22–31)
COLOR SPEC: YELLOW — SIGNIFICANT CHANGE UP
CREAT SERPL-MCNC: 0.75 MG/DL — SIGNIFICANT CHANGE UP (ref 0.5–1.3)
DIFF PNL FLD: ABNORMAL
EOSINOPHIL # BLD AUTO: 0.02 K/UL — SIGNIFICANT CHANGE UP (ref 0–0.5)
EOSINOPHIL NFR BLD AUTO: 0.2 % — SIGNIFICANT CHANGE UP (ref 0–6)
EPI CELLS # UR: 4 /HPF — SIGNIFICANT CHANGE UP (ref 0–5)
GLUCOSE SERPL-MCNC: 94 MG/DL — SIGNIFICANT CHANGE UP (ref 70–99)
GLUCOSE UR QL: NEGATIVE — SIGNIFICANT CHANGE UP
HCT VFR BLD CALC: 43.8 % — SIGNIFICANT CHANGE UP (ref 34.5–45)
HGB BLD-MCNC: 15 G/DL — SIGNIFICANT CHANGE UP (ref 11.5–15.5)
IANC: 7.76 K/UL — SIGNIFICANT CHANGE UP (ref 1.5–8.5)
IMM GRANULOCYTES NFR BLD AUTO: 0.4 % — SIGNIFICANT CHANGE UP (ref 0–1.5)
KETONES UR-MCNC: ABNORMAL
LEUKOCYTE ESTERASE UR-ACNC: ABNORMAL
LIDOCAIN IGE QN: 24 U/L — SIGNIFICANT CHANGE UP (ref 7–60)
LYMPHOCYTES # BLD AUTO: 2.59 K/UL — SIGNIFICANT CHANGE UP (ref 1–3.3)
LYMPHOCYTES # BLD AUTO: 22.5 % — SIGNIFICANT CHANGE UP (ref 13–44)
MCHC RBC-ENTMCNC: 29.2 PG — SIGNIFICANT CHANGE UP (ref 27–34)
MCHC RBC-ENTMCNC: 34.2 GM/DL — SIGNIFICANT CHANGE UP (ref 32–36)
MCV RBC AUTO: 85.2 FL — SIGNIFICANT CHANGE UP (ref 80–100)
MONOCYTES # BLD AUTO: 1.02 K/UL — HIGH (ref 0–0.9)
MONOCYTES NFR BLD AUTO: 8.9 % — SIGNIFICANT CHANGE UP (ref 2–14)
NEUTROPHILS # BLD AUTO: 7.76 K/UL — HIGH (ref 1.8–7.4)
NEUTROPHILS NFR BLD AUTO: 67.3 % — SIGNIFICANT CHANGE UP (ref 43–77)
NITRITE UR-MCNC: NEGATIVE — SIGNIFICANT CHANGE UP
NRBC # BLD: 0 /100 WBCS — SIGNIFICANT CHANGE UP
NRBC # FLD: 0 K/UL — SIGNIFICANT CHANGE UP
PH UR: 6.5 — SIGNIFICANT CHANGE UP (ref 5–8)
PLATELET # BLD AUTO: 313 K/UL — SIGNIFICANT CHANGE UP (ref 150–400)
POTASSIUM SERPL-MCNC: 3.7 MMOL/L — SIGNIFICANT CHANGE UP (ref 3.5–5.3)
POTASSIUM SERPL-SCNC: 3.7 MMOL/L — SIGNIFICANT CHANGE UP (ref 3.5–5.3)
PROT SERPL-MCNC: 8.3 G/DL — SIGNIFICANT CHANGE UP (ref 6–8.3)
PROT UR-MCNC: ABNORMAL
RBC # BLD: 5.14 M/UL — SIGNIFICANT CHANGE UP (ref 3.8–5.2)
RBC # FLD: 12.3 % — SIGNIFICANT CHANGE UP (ref 10.3–14.5)
SODIUM SERPL-SCNC: 137 MMOL/L — SIGNIFICANT CHANGE UP (ref 135–145)
SP GR SPEC: 1.04 — SIGNIFICANT CHANGE UP (ref 1–1.05)
UROBILINOGEN FLD QL: SIGNIFICANT CHANGE UP
WBC # BLD: 11.52 K/UL — HIGH (ref 3.8–10.5)
WBC # FLD AUTO: 11.52 K/UL — HIGH (ref 3.8–10.5)
WBC UR QL: 2 /HPF — SIGNIFICANT CHANGE UP (ref 0–5)

## 2022-02-06 PROCEDURE — 99284 EMERGENCY DEPT VISIT MOD MDM: CPT

## 2022-02-06 PROCEDURE — 76705 ECHO EXAM OF ABDOMEN: CPT | Mod: 26

## 2022-02-06 PROCEDURE — 71046 X-RAY EXAM CHEST 2 VIEWS: CPT | Mod: 26

## 2022-02-06 RX ORDER — SODIUM CHLORIDE 9 MG/ML
1000 INJECTION, SOLUTION INTRAVENOUS ONCE
Refills: 0 | Status: COMPLETED | OUTPATIENT
Start: 2022-02-06 | End: 2022-02-06

## 2022-02-06 RX ORDER — NITROFURANTOIN MACROCRYSTAL 50 MG
1 CAPSULE ORAL
Qty: 10 | Refills: 0
Start: 2022-02-06 | End: 2022-02-10

## 2022-02-06 RX ORDER — ONDANSETRON 8 MG/1
4 TABLET, FILM COATED ORAL ONCE
Refills: 0 | Status: COMPLETED | OUTPATIENT
Start: 2022-02-06 | End: 2022-02-06

## 2022-02-06 RX ORDER — METOCLOPRAMIDE HCL 10 MG
10 TABLET ORAL ONCE
Refills: 0 | Status: COMPLETED | OUTPATIENT
Start: 2022-02-06 | End: 2022-02-06

## 2022-02-06 RX ORDER — FAMOTIDINE 10 MG/ML
20 INJECTION INTRAVENOUS ONCE
Refills: 0 | Status: COMPLETED | OUTPATIENT
Start: 2022-02-06 | End: 2022-02-06

## 2022-02-06 RX ORDER — ONDANSETRON 8 MG/1
1 TABLET, FILM COATED ORAL
Qty: 12 | Refills: 0
Start: 2022-02-06 | End: 2022-02-09

## 2022-02-06 RX ORDER — ACETAMINOPHEN 500 MG
975 TABLET ORAL ONCE
Refills: 0 | Status: COMPLETED | OUTPATIENT
Start: 2022-02-06 | End: 2022-02-06

## 2022-02-06 RX ORDER — MORPHINE SULFATE 50 MG/1
2 CAPSULE, EXTENDED RELEASE ORAL ONCE
Refills: 0 | Status: DISCONTINUED | OUTPATIENT
Start: 2022-02-06 | End: 2022-02-06

## 2022-02-06 RX ADMIN — MORPHINE SULFATE 2 MILLIGRAM(S): 50 CAPSULE, EXTENDED RELEASE ORAL at 10:35

## 2022-02-06 RX ADMIN — Medication 975 MILLIGRAM(S): at 09:47

## 2022-02-06 RX ADMIN — Medication 10 MILLIGRAM(S): at 10:35

## 2022-02-06 RX ADMIN — SODIUM CHLORIDE 1000 MILLILITER(S): 9 INJECTION, SOLUTION INTRAVENOUS at 09:00

## 2022-02-06 RX ADMIN — ONDANSETRON 4 MILLIGRAM(S): 8 TABLET, FILM COATED ORAL at 09:47

## 2022-02-06 RX ADMIN — FAMOTIDINE 20 MILLIGRAM(S): 10 INJECTION INTRAVENOUS at 09:47

## 2022-02-06 NOTE — ED PROVIDER NOTE - CLINICAL SUMMARY MEDICAL DECISION MAKING FREE TEXT BOX
21F PMH ETOH use, pancreatitis, anxiety, depression complaining of 3 days of intractable vomiting s/p alcohol use and marijuana use, associated with epigastric abdominal pain that radiates to the back and chest pain with vomiting. Given hx and PE concern for cannabinoid hyperemesis vs pancreatitis vs cholecystitis vs oskar sanchez tear. Not concerned for borehaave given no crepitus but will eval for pneumomediastinum vis CXR. Will obtain labs, UA, RUQ US, IVF pain control. 21F PMH ETOH use, pancreatitis, anxiety, depression complaining of 3 days of intractable vomiting s/p alcohol use and marijuana use, associated with epigastric abdominal pain that radiates to the back and chest pain with vomiting. Given hx and PE concern for cannabinoid hyperemesis vs pancreatitis vs cholecystitis vs oskar sanchez tear. Not concerned for borehaave given no crepitus but will eval for pneumomediastinum vis CXR. Will obtain labs, UA, RUQ US, IVF, meds.

## 2022-02-06 NOTE — ED ADULT NURSE NOTE - NSFALLRSKINDICATORS_ED_ALL_ED
[place] eMERGENCY dEPARTMENT encounter:    [facility] EMERGENCY SERVICES  [address]  [place] [address]  [phone]    CHIEF COMPLAINT:    Chief Complaint   Patient presents with   • Flank Pain       HPI:    This is a 32 year old male who presented to the ED for evaluation of right flank pain. Patient reports this occurred approximately an hour prior to arrival. Patient reports he is nauseous and has vomited. He denies any fevers. He did not take any medication for this. He reports is otherwise healthy. He has not noted any blood in the urine.    ALLERGIES:    ALLERGIES:  No Known Allergies    CURRENT MEDICATIONS:    No current facility-administered medications for this encounter.      Current Outpatient Prescriptions   Medication Sig Dispense Refill   • HYDROcodone-acetaminophen (NORCO) 5-325 MG per tablet Take 1 tablet by mouth every 6 hours as needed for Pain. 10 tablet 0   • ibuprofen (MOTRIN) 600 MG tablet Take 1 tablet by mouth every 6 hours as needed for Pain. 30 tablet 0   • silver sulfADIAZINE (THERMAZENE) 1 % cream Apply topically daily. Apply to a thickness of 1/16 inch (\"nickel thick\"). 50 g 0   • triamcinolone (ARISTOCORT) 0.1 % cream Apply BID-TID 15 g 5   • lansoprazole (PREVACID) 30 MG capsule Take 1 capsule by mouth 2 times daily. 28 capsule 0   • clarithromycin (BIAXIN) 500 MG tablet Take 1 tablet by mouth 2 times daily. 28 tablet 0   • amoxicillin (AMOXIL) 500 MG capsule Take 2 capsules by mouth 2 times daily. 56 capsule 0   • sucralfate (CARAFATE) 1 G tablet Take 1 tablet by mouth 4 times daily. 80 tablet 0   • omeprazole (PRILOSEC) 40 MG capsule Take 1 capsule by mouth daily. 30 capsule 2   • dicyclomine (BENTYL) 10 MG capsule Take 1 capsule by mouth 4 times daily (before meals and nightly). 120 capsule 0       PAST MEDICAL HISTORY:    History reviewed. No pertinent past medical history.    SURGICAL HISTORY:    History reviewed. No pertinent surgical  no history.    SOCIAL HISTORY:    Social History     Social History   • Marital status: Single     Spouse name: N/A   • Number of children: N/A   • Years of education: N/A     Social History Main Topics   • Smoking status: Never Smoker   • Smokeless tobacco: Never Used   • Alcohol use No   • Drug use:      Types: Marijuana   • Sexual activity: Not Asked     Other Topics Concern   • None     Social History Narrative   • None       FAMILY HISTORY:    Family History   Problem Relation Age of Onset   • Arthritis Neg Hx    • Asthma Neg Hx    • COPD Neg Hx    • Cancer Neg Hx    • Diabetes Neg Hx    • Depression Neg Hx    • Heart disease Neg Hx    • High blood pressure Neg Hx    • High cholesterol Neg Hx    • Mental illness Neg Hx    • Stroke Neg Hx        REVIEW OF SYSTEMS:    As above per the HPI.  All other systems reviewed and otherwise negative.  Constitutional: Negative for fever and chills.   Skin: Negative for rash.   HEENT: Negative for ear pain or sore throat.  Respiratory: Negative cough or shortness of breath.    Cardiovascular: Negative for chest pain or chest pressure.   Gastrointestinal: Right flank pain. See history of present illness  Genitourinary: Negative for dysuria, urgency or frequency.  Extremities:  Negative for joint swelling or joint pain.  CNS: Negative for headache, dizziness, blurry vision.  Psych: Negative for mood swings, depression.    PHYSICAL EXAM:   ED Triage Vitals [09/13/17 2029]   /63   Pulse 73   Resp 18   Temp    SpO2 100 %      Pulse Ox Interpretation: Within normal limits.  General: Alert, cooperative, conversive. No acute distress.  Skin:  Warm and dry without rash.    Head:  Normocephalic-atraumatic.   Neck:  Trachea is midline. No adenopathy.     Eye:  Normal conjunctiva and sclera.     EENT: Mucous membranes are moist.  Cardiovascular: Symmetrical pulses.  RRR  Respiratory:  Normal respiratory effort. CTA.  Gastrointestinal: soft, nontender, and non-distended. Positive  right CVA tenderness  Normal bowel sounds.  Musculoskeletal:  No deformity or edema.   Neuro: Orientated x 4. Cranial nerves exam normal. No focal deficits. Deep tendon reflexes within normal limits. No ataxia  Psych: Patient appears anxious.      Labs   Results for orders placed or performed during the hospital encounter of 09/13/17   Urinalysis & Reflex Micro with Culture if Indicated   Result Value    COLOR YELLOW    APPEARANCE CLEAR    GLUCOSE(URINE) NEGATIVE    BILIRUBIN NEGATIVE    KETONES TRACE (A)    SPECIFIC GRAVITY 1.020    BLOOD LARGE (A)    pH 6.0    PROTEIN(URINE) NEGATIVE    UROBILINOGEN 0.2    NITRITE NEGATIVE    LEUKOCYTE ESTERASE NEGATIVE    SPECIMEN TYPE URINE, CLEAN CATCH/MIDSTREAM   CBC & Auto Differential   Result Value    WBC 9.0    RBC 4.48 (L)    HGB 14.4    HCT 40.9    MCV 91.3    MCH 32.1    MCHC 35.2    RDW-CV 11.8        DIFF TYPE AUTOMATED DIFFERENTIAL    Neutrophil 27    LYMPH 64    MONO 8    EOSIN 1    BASO 0    Absolute Neutrophil 2.5    Absolute Lymph 5.7 (H)    Absolute Mono 0.7    Absolute Eos 0.1    Absolute Baso 0.0   Comprehensive Metabolic Panel   Result Value    Sodium 142    Potassium 2.8 (L)    Chloride 105    Carbon Dioxide 22    Anion Gap 18    Glucose 129 (H)    BUN 21 (H)    Creatinine 1.05    GFR Estimate,  >90     Comment: eGFR results = or >90 mL/min/1.73m2 = Normal kidney function.    GFR Estimate, Non  >90     Comment: eGFR results = or >90 mL/min/1.73m2 = Normal kidney function.    BUN/Creatinine Ratio 20    CALCIUM 9.0    TOTAL BILIRUBIN 0.3    AST/SGOT 17    ALT/SGPT 21    ALK PHOSPHATASE 57    TOTAL PROTEIN 6.8    Albumin 3.9    GLOBULIN 2.9    A/G Ratio, Serum 1.3   Lipase Level   Result Value    Lipase 157   Urinalysis Microscopic   Result Value    Squamous EPI'S NONE SEEN    RBC >100 (H)    WBC NONE SEEN    BACTERIA NONE SEEN    Hyaline Casts NONE SEEN       I have reviewed labs      Radiology, Images   CT ABDOMEN PELVIS  FOR KIDNEY STONES   Final Result   IMPRESSION:   2 mm calculus identified within the pelvis just to the right of midline and   just posterior and superior to the urinary bladder. While the nearly   midline location suggests this may be a phlebolith a distal right ureteral   calculus cannot be completely excluded. If further imaging evaluation is   clinically indicated repeat examination to include excretory phase CT   urography could be performed.            I have reviewed radiology images and impressions    ED Course/ MDM:  32-year-old male presents emergency Department with right flank pain that has been ongoing for last hour. Patient reports cancer suddenly. He denies any fever. We'll evaluate laboratory workup and CT imaging of the abdomen.    Patient presents because better after fluids and Toradol.    Patient's potassium is 2.8, patient was hyperventilating prior to arrival, it is likely this is causing a transient effect. We will still give 60 mg of p.o. potassium here.      Patient reports pain so has not returned. He has been updated on CT results that could not definitely say a ureteral stone is present, however patient had sudden onset of pain, flank pain, relieved with Toradol and fluids I will call this a ureteral stone. Patient may follow-up with primary care physician as well as urology.    Diagnosis  The encounter diagnosis was Ureteral stone.    Follow Up:  Mary Francis MD  [address]  [address]  [facility] [address]  [phone]    In 1 day           ED Medications   ED Medication Orders     Start Ordered     Status Ordering Provider    09/13/17 2144 09/13/17 2145  potassium chloride (K-DUR,KLOR-CON) CR tablet 60 mEq  ONCE      Last MAR action:  Given EMELINA WARNER    09/13/17 2032 09/13/17 2031  sodium chloride (NORMAL SALINE) 0.9 % bolus 1,000 mL  ONCE      Last MAR action:  Completed ISACC RODRIGUEZ    09/13/17 2032 09/13/17 2031  ondansetron (ZOFRAN) injection 4 mg  ONCE      Last MAR  action:  Given ISACC RODRIGUEZ    09/13/17 2032 09/13/17 2031  ketorolac injection 15 mg  ONCE      Last MAR action:  Given ISACC RODRIGUEZ        Discussed with Dr. Padilla regarding patient presentation, physical exam, lab results and is in agreement with the pt's work-up and plan of action.            Malia Terrazas PA-C  09/13/17 [id number]

## 2022-02-06 NOTE — ED PROVIDER NOTE - PROGRESS NOTE DETAILS
Casandra Clark DO (PGY1): Pt nausea improved with medication. Tolerating PO. UA positive for infection. Will send antibiotics to pharmacy with zofran and dc. Pt made aware of lab and imaging results. Questions regarding their symptoms were addressed. Advised to follow up with primary care doctor . Given strict return precautions. Pt verbalized understanding.

## 2022-02-06 NOTE — ED PROVIDER NOTE - NSICDXPASTMEDICALHX_GEN_ALL_CORE_FT
PAST MEDICAL HISTORY:  Bipolar affect, depressed     Depression     Self mutilating behavior     Suicide attempt

## 2022-02-06 NOTE — ED ADULT NURSE NOTE - OBJECTIVE STATEMENT
Pt received AOx4, ambulatory at baseline presents to the ED w. chief complaint of abdominal pain associated w. nausea and vomiting. Pt states she has not been able to tolerate PO intake. Pt states she drinks about 2-3 x/week and does not surpass 6 drinks. Pt states last time she drank was 3 days ago. Daily marijuana user, last one being yesterday. Denies SI/HI. 20G placed in RAC.

## 2022-02-06 NOTE — ED ADULT TRIAGE NOTE - CHIEF COMPLAINT QUOTE
pt presenting for abdominal pain and vomiting, states she has not been able to tolerate po intake. hx of pancreatitis 2/2 etoh. states she is not a daily drinker but does drink a few times a week. ekg in progress. last drink 3 days ago.

## 2022-02-06 NOTE — ED ADULT NURSE NOTE - NSHOSCREENINGQ1_ED_ALL_ED
19 0924   OT Individual Minutes   Time In 0732   Time Out 0805   Minutes [address]   ACUTE REHABILITATION OCCUPATIONAL THERAPY  DAILY NOTE    Date: 2019  Patient Name: Todd Servin        MRN: [id number]    : 1964  (47 y.o.)  Gender: male   Referring Practitioner: Kirby Arenas MD  Diagnosis: Acute CVA, cardiac arrest, CABG    Restrictions  Restrictions/Precautions: Cardiac, General Precautions, Surgical Protocols, Fall Risk(telesitter)  Sternal Precautions: Emergent CABG X4  Other position/activity restrictions: Fall Risk  Required Braces or Orthoses  Other: Heart Hugger Brace  Right Lower Extremity Brace: Ankle Foot Orthotics  RLE Brace Type: Profo boot and AFO  Left Lower Extremity Brace: Boot  LLE Brace Type: Profo boot      Subjective \"they are looking to approve me at [employer]"  Patient Currently in Pain: Yes  Pain Level: 3  Pain Location: Leg  Overall Orientation Status: Within Functional Limits  Orientation Level: Oriented X4    Objective   Pt d/c expected today. Bed mobility  Rolling to Left: Modified independent  Supine to Sit: Modified independent(head of bed up )                                      Light Housekeeping  Light Housekeeping Level: [place] [employer] Level of Assistance: Contact guard assistance(due to pt experiencing one LOB requring Min a for correcting)  Light Housekeeping: Pt removing clothing from dryer , placing on table top for folding   Health Management  Health Management Level: Wheelchair(seated at tabletop )  Health Management Level of Assistance: Stand by assistance(req cueing for direction and recognition of errors)  Health Management: Pt participated in med mgmt task to address good understanding and safety for his own medication mgmt as well as his residents. 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  Pt reports prior to this admission he did not take any pills himself as part of his daily routine. Pt states he is familiar c weekly pill organizers-typically uses x2 daily x7 day week containers or \"we use baggies sometimes\". Writer educ on importance of good med mgmt and purpose of provided therapeutic task-pt verbalizes good understanding. Pt organizes x4 different types of faciliated medications c 50% accuracy. Pt req assist for recognition of errors c medications listed as: \"Take 2 tablets 2x a day\" and \"Take 1 tablet twice daily\". Writer provided cueing on error, pt was able to verbalize proper correction. OT FIM:   Eatin - Feeds self with adaptive equipment/dentures and/or feeds self with modified diet and/or performs own tube feeding  Groomin - Patient independent with all grooming tasks  Bathin - Able to bathe all 10 areas with device(long handled sponge )  Dressing-Upper: 7 - Patient independently dresses upper body  Dressing-Lower: 6 - Independent with device/prosthesis  Toiletin - Requires device (grab bar/walker/etc.)  Toilet Transfer: 6 - Independent with device (grab bar/walker/slide bar)  Primary Mode: Shower  Tub Transfer: 0 - Activity does not occur  Shower Transfer: 6 - Modified independence               Assessment  Performance deficits / Impairments: Decreased functional mobility ; Decreased cognition;Decreased high-level IADLs;Decreased endurance;Decreased safe awareness;Decreased strength  Activity Tolerance: Patient Tolerated treatment well  Safety Devices in place: Yes  Type of devices: Call light within reach; Left in chair;Gait belt          Patient Education:     Learner:patient  Method: explanation       Outcome: demonstrated understanding     Plan  Continue current plan of care         Electronically signed by REGINE Shine on 19 at 9:34 AM No

## 2022-02-06 NOTE — ED PROVIDER NOTE - NSFOLLOWUPINSTRUCTIONS_ED_ALL_ED_FT
You were seen in the emergency department for vomiting and abdominal pain.   Your lab results showed a urinary tract infection.   Your imaging results showed no acute inflammation of gallbladder.   You were given zofran and reglan and morphine.   Please follow up with primary care doctor and GI doctor.   A prescription for was sent to your pharmacy for antibiotics and zofran, please take it as prescribed.   For pain you can take ibuprofen or acetaminophen according to the bottle instructions.   See attached information on abdominal pain.   If you need a primary care doctor you can call: [phone]     Abdominal Pain    Many things can cause abdominal pain. Many times, abdominal pain is not caused by a disease and will improve without treatment. Your health care provider will do a physical exam to determine if there is a dangerous cause of your pain; blood tests and imaging may help determine the cause of your pain. However, in many cases, no cause may be found and you may need further testing as an outpatient. Monitor your abdominal pain for any changes.     SEEK IMMEDIATE MEDICAL CARE IF YOU HAVE ANY OF THE FOLLOWING SYMPTOMS: worsening abdominal pain, uncontrollable vomiting, profuse diarrhea, inability to have bowel movements or pass gas, black or bloody stools, fever accompanying chest pain or back pain, or fainting. These symptoms may represent a serious problem that is an emergency. Do not wait to see if the symptoms will go away. Get medical help right away. Call 911 and do not drive yourself to the hospital.

## 2022-02-06 NOTE — ED PROVIDER NOTE - NSFOLLOWUPCLINICS_GEN_ALL_ED_FT
Gastroenterology at [facility]  Gastroenterology  [address] [place] [address]  Phone: [phone]  Fax:     [facility]  Gastroenterology  [address] [place] [id number]  Phone: [phone]  Fax:

## 2022-02-06 NOTE — ED PROVIDER NOTE - OBJECTIVE STATEMENT
21F PMH ETOH use, pancreatitis, anxiety, depression complaining of 3 days of intractable vomiting s/p alcohol use and marijuana use, associated with epigastric abdominal pain that radiates to the back and chest pain with vomiting. Reports first episode of vomiting had bright red chunks and streaks after drinking red pedialite but no blood noted on other episodes. Unable to tolerate PO. Passing gas, last BM 2 days ago. Denies fever, diarrhea, SOB, headache, dizziness. LMP 2 weeks ago. On OCPs. 21F PMH ETOH use, pancreatitis, anxiety, depression complaining of 3 days of intractable vomiting s/p alcohol use and marijuana use, associated with epigastric abdominal pain that radiates to the back and chest pain with vomiting. Reports first episode of vomiting had bright red chunks and streaks after drinking red pedialite but no blood noted on other episodes. Unable to tolerate PO. Passing gas, last BM 2 days ago. Denies fever, diarrhea, SOB, headache, dizziness. LMP 2 weeks ago. On OCPs. Denies suicidal ideations or ingestions.

## 2022-02-06 NOTE — ED PROVIDER NOTE - PATIENT PORTAL LINK FT
complains of pain/discomfort/rt legv You can access the FollowMyHealth Patient Portal offered by [facility] by registering at the following website: [email or web address]. By joining [employer]’s FollowMyHealth portal, you will also be able to view your health information using other applications (apps) compatible with our system.

## 2022-02-06 NOTE — ED PROVIDER NOTE - PHYSICAL EXAMINATION
ICU Vital Signs Last 24 Hrs  T(C): 36.2 (06 Feb 2022 07:58), Max: 36.2 (06 Feb 2022 07:58)  T(F): 97.2 (06 Feb 2022 07:58), Max: 97.2 (06 Feb 2022 07:58)  HR: 81 (06 Feb 2022 07:58) (81 - 81)  BP: 122/75 (06 Feb 2022 07:58) (122/75 - 122/75)  RR: 18 (06 Feb 2022 07:58) (18 - 18)  SpO2: 100% (06 Feb 2022 07:58) (100% - 100%)    GENERAL: Awake. Alert. NAD. Well nourished.  HEENT: NC/AT, PERRL, Conjunctiva pink, no scleral icterus. Airway patent. Moist mucous membranes.  LUNGS: CTAB. No wheezes or rales noted.  CARDIAC: Chest TTP at xiphoid. RRR. S1 and S2 intact. No murmurs noted.  ABDOMEN: No masses noted. Soft, RUQ TTP, epigastric TTP, no rebound, no guarding.  BACK: No midline spinal tenderness, no CVA tenderness  EXT: No edema, no calf tenderness, distal pulses 2+ bilaterally  NEURO: A&Ox3. Moving all extremities.   SKIN: Warm and dry. Bruises noted on b/l lower extremities.  PSYCH: Normal affect.

## 2022-02-06 NOTE — ED PROVIDER NOTE - ATTENDING CONTRIBUTION TO CARE
Dr. Ruiz: 22 yo female with PMH EtOH abuse in past, EtOH-induced pancreatitis (7 years ago), anxiety, depression, in ED with 3 days of epigastric pain and continuous episodes of vomiting.  First episode of vomiting was reportedly bloody but since then episodes have been nonbloody.  Pt thinks that pain and vomiting are related to recent EtOH use, also smokes marijuana regularly.  She continues to pass gas but is unable to tolerate PO; last bowel movement 2 days ago.  No fever, SOB or urinary complaints.  On exam pt uncomfortable appearing, in moderate distress due to vomiting, heart RRR, lungs CTAB, abd TTP epigastrum and RUQ but without rebound or guarding, extremities without swelling, strength 5/5 in all extremities and skin without rash.

## 2022-02-07 ENCOUNTER — TRANSCRIPTION ENCOUNTER (OUTPATIENT)
Age: 22
End: 2022-02-07

## 2022-02-07 ENCOUNTER — INPATIENT (INPATIENT)
Facility: HOSPITAL | Age: 22
LOS: 0 days | Discharge: LEFT BEFORE DISCHARGE | End: 2022-02-07
Attending: STUDENT IN AN ORGANIZED HEALTH CARE EDUCATION/TRAINING PROGRAM | Admitting: STUDENT IN AN ORGANIZED HEALTH CARE EDUCATION/TRAINING PROGRAM
Payer: COMMERCIAL

## 2022-02-07 VITALS
TEMPERATURE: 98 F | HEIGHT: 62 IN | RESPIRATION RATE: 18 BRPM | DIASTOLIC BLOOD PRESSURE: 82 MMHG | OXYGEN SATURATION: 100 % | SYSTOLIC BLOOD PRESSURE: 120 MMHG | HEART RATE: 74 BPM

## 2022-02-07 VITALS
RESPIRATION RATE: 20 BRPM | OXYGEN SATURATION: 99 % | TEMPERATURE: 98 F | HEART RATE: 81 BPM | SYSTOLIC BLOOD PRESSURE: 116 MMHG | DIASTOLIC BLOOD PRESSURE: 79 MMHG

## 2022-02-07 DIAGNOSIS — Z98.89 OTHER SPECIFIED POSTPROCEDURAL STATES: Chronic | ICD-10-CM

## 2022-02-07 DIAGNOSIS — Z86.59 PERSONAL HISTORY OF OTHER MENTAL AND BEHAVIORAL DISORDERS: ICD-10-CM

## 2022-02-07 DIAGNOSIS — R11.2 NAUSEA WITH VOMITING, UNSPECIFIED: ICD-10-CM

## 2022-02-07 DIAGNOSIS — R10.9 UNSPECIFIED ABDOMINAL PAIN: ICD-10-CM

## 2022-02-07 DIAGNOSIS — E87.6 HYPOKALEMIA: ICD-10-CM

## 2022-02-07 DIAGNOSIS — N39.0 URINARY TRACT INFECTION, SITE NOT SPECIFIED: ICD-10-CM

## 2022-02-07 DIAGNOSIS — F31.9 BIPOLAR DISORDER, UNSPECIFIED: ICD-10-CM

## 2022-02-07 LAB
ALBUMIN SERPL ELPH-MCNC: 4.3 G/DL — SIGNIFICANT CHANGE UP (ref 3.3–5)
ALP SERPL-CCNC: 56 U/L — SIGNIFICANT CHANGE UP (ref 40–120)
ALT FLD-CCNC: 13 U/L — SIGNIFICANT CHANGE UP (ref 4–33)
ANION GAP SERPL CALC-SCNC: 14 MMOL/L — SIGNIFICANT CHANGE UP (ref 7–14)
AST SERPL-CCNC: 17 U/L — SIGNIFICANT CHANGE UP (ref 4–32)
BASOPHILS # BLD AUTO: 0.08 K/UL — SIGNIFICANT CHANGE UP (ref 0–0.2)
BASOPHILS NFR BLD AUTO: 0.8 % — SIGNIFICANT CHANGE UP (ref 0–2)
BILIRUB SERPL-MCNC: 0.7 MG/DL — SIGNIFICANT CHANGE UP (ref 0.2–1.2)
BUN SERPL-MCNC: 15 MG/DL — SIGNIFICANT CHANGE UP (ref 7–23)
CALCIUM SERPL-MCNC: 9 MG/DL — SIGNIFICANT CHANGE UP (ref 8.4–10.5)
CHLORIDE SERPL-SCNC: 98 MMOL/L — SIGNIFICANT CHANGE UP (ref 98–107)
CO2 SERPL-SCNC: 24 MMOL/L — SIGNIFICANT CHANGE UP (ref 22–31)
CREAT SERPL-MCNC: 0.76 MG/DL — SIGNIFICANT CHANGE UP (ref 0.5–1.3)
CULTURE RESULTS: SIGNIFICANT CHANGE UP
EOSINOPHIL # BLD AUTO: 0.01 K/UL — SIGNIFICANT CHANGE UP (ref 0–0.5)
EOSINOPHIL NFR BLD AUTO: 0.1 % — SIGNIFICANT CHANGE UP (ref 0–6)
GLUCOSE SERPL-MCNC: 89 MG/DL — SIGNIFICANT CHANGE UP (ref 70–99)
HCG SERPL-ACNC: <5 MIU/ML — SIGNIFICANT CHANGE UP
HCT VFR BLD CALC: 38.8 % — SIGNIFICANT CHANGE UP (ref 34.5–45)
HGB BLD-MCNC: 13.6 G/DL — SIGNIFICANT CHANGE UP (ref 11.5–15.5)
IANC: 6.3 K/UL — SIGNIFICANT CHANGE UP (ref 1.5–8.5)
IMM GRANULOCYTES NFR BLD AUTO: 0.3 % — SIGNIFICANT CHANGE UP (ref 0–1.5)
LYMPHOCYTES # BLD AUTO: 2.31 K/UL — SIGNIFICANT CHANGE UP (ref 1–3.3)
LYMPHOCYTES # BLD AUTO: 24.3 % — SIGNIFICANT CHANGE UP (ref 13–44)
MCHC RBC-ENTMCNC: 29.8 PG — SIGNIFICANT CHANGE UP (ref 27–34)
MCHC RBC-ENTMCNC: 35.1 GM/DL — SIGNIFICANT CHANGE UP (ref 32–36)
MCV RBC AUTO: 85.1 FL — SIGNIFICANT CHANGE UP (ref 80–100)
MONOCYTES # BLD AUTO: 0.79 K/UL — SIGNIFICANT CHANGE UP (ref 0–0.9)
MONOCYTES NFR BLD AUTO: 8.3 % — SIGNIFICANT CHANGE UP (ref 2–14)
NEUTROPHILS # BLD AUTO: 6.3 K/UL — SIGNIFICANT CHANGE UP (ref 1.8–7.4)
NEUTROPHILS NFR BLD AUTO: 66.2 % — SIGNIFICANT CHANGE UP (ref 43–77)
NRBC # BLD: 0 /100 WBCS — SIGNIFICANT CHANGE UP
NRBC # FLD: 0 K/UL — SIGNIFICANT CHANGE UP
PLATELET # BLD AUTO: 271 K/UL — SIGNIFICANT CHANGE UP (ref 150–400)
POTASSIUM SERPL-MCNC: 3.2 MMOL/L — LOW (ref 3.5–5.3)
POTASSIUM SERPL-SCNC: 3.2 MMOL/L — LOW (ref 3.5–5.3)
PROT SERPL-MCNC: 7.1 G/DL — SIGNIFICANT CHANGE UP (ref 6–8.3)
RBC # BLD: 4.56 M/UL — SIGNIFICANT CHANGE UP (ref 3.8–5.2)
RBC # FLD: 12.1 % — SIGNIFICANT CHANGE UP (ref 10.3–14.5)
SARS-COV-2 RNA SPEC QL NAA+PROBE: SIGNIFICANT CHANGE UP
SODIUM SERPL-SCNC: 136 MMOL/L — SIGNIFICANT CHANGE UP (ref 135–145)
SPECIMEN SOURCE: SIGNIFICANT CHANGE UP
WBC # BLD: 9.52 K/UL — SIGNIFICANT CHANGE UP (ref 3.8–10.5)
WBC # FLD AUTO: 9.52 K/UL — SIGNIFICANT CHANGE UP (ref 3.8–10.5)

## 2022-02-07 PROCEDURE — 99285 EMERGENCY DEPT VISIT HI MDM: CPT

## 2022-02-07 PROCEDURE — 74177 CT ABD & PELVIS W/CONTRAST: CPT | Mod: 26,QQ

## 2022-02-07 PROCEDURE — 99223 1ST HOSP IP/OBS HIGH 75: CPT | Mod: GC

## 2022-02-07 RX ORDER — SODIUM CHLORIDE 9 MG/ML
1000 INJECTION INTRAMUSCULAR; INTRAVENOUS; SUBCUTANEOUS ONCE
Refills: 0 | Status: COMPLETED | OUTPATIENT
Start: 2022-02-07 | End: 2022-02-07

## 2022-02-07 RX ORDER — ACETAMINOPHEN 500 MG
1000 TABLET ORAL ONCE
Refills: 0 | Status: DISCONTINUED | OUTPATIENT
Start: 2022-02-07 | End: 2022-02-07

## 2022-02-07 RX ORDER — ONDANSETRON 8 MG/1
4 TABLET, FILM COATED ORAL EVERY 8 HOURS
Refills: 0 | Status: DISCONTINUED | OUTPATIENT
Start: 2022-02-07 | End: 2022-02-07

## 2022-02-07 RX ORDER — METOCLOPRAMIDE HCL 10 MG
10 TABLET ORAL ONCE
Refills: 0 | Status: COMPLETED | OUTPATIENT
Start: 2022-02-07 | End: 2022-02-07

## 2022-02-07 RX ORDER — NITROFURANTOIN MACROCRYSTAL 50 MG
100 CAPSULE ORAL
Refills: 0 | Status: DISCONTINUED | OUTPATIENT
Start: 2022-02-07 | End: 2022-02-07

## 2022-02-07 RX ORDER — ACETAMINOPHEN 500 MG
650 TABLET ORAL EVERY 6 HOURS
Refills: 0 | Status: DISCONTINUED | OUTPATIENT
Start: 2022-02-07 | End: 2022-02-07

## 2022-02-07 RX ORDER — ACETAMINOPHEN 500 MG
680 TABLET ORAL ONCE
Refills: 0 | Status: COMPLETED | OUTPATIENT
Start: 2022-02-07 | End: 2022-02-07

## 2022-02-07 RX ORDER — PANTOPRAZOLE SODIUM 20 MG/1
40 TABLET, DELAYED RELEASE ORAL
Refills: 0 | Status: DISCONTINUED | OUTPATIENT
Start: 2022-02-07 | End: 2022-02-07

## 2022-02-07 RX ORDER — ONDANSETRON 8 MG/1
4 TABLET, FILM COATED ORAL ONCE
Refills: 0 | Status: COMPLETED | OUTPATIENT
Start: 2022-02-07 | End: 2022-02-07

## 2022-02-07 RX ORDER — HALOPERIDOL DECANOATE 100 MG/ML
2.5 INJECTION INTRAMUSCULAR ONCE
Refills: 0 | Status: COMPLETED | OUTPATIENT
Start: 2022-02-07 | End: 2022-02-07

## 2022-02-07 RX ORDER — ACETAMINOPHEN 500 MG
681 TABLET ORAL ONCE
Refills: 0 | Status: DISCONTINUED | OUTPATIENT
Start: 2022-02-07 | End: 2022-02-07

## 2022-02-07 RX ORDER — POTASSIUM CHLORIDE 20 MEQ
40 PACKET (EA) ORAL ONCE
Refills: 0 | Status: COMPLETED | OUTPATIENT
Start: 2022-02-07 | End: 2022-02-07

## 2022-02-07 RX ORDER — ARIPIPRAZOLE 15 MG/1
10 TABLET ORAL AT BEDTIME
Refills: 0 | Status: DISCONTINUED | OUTPATIENT
Start: 2022-02-07 | End: 2022-02-07

## 2022-02-07 RX ORDER — FAMOTIDINE 10 MG/ML
20 INJECTION INTRAVENOUS ONCE
Refills: 0 | Status: COMPLETED | OUTPATIENT
Start: 2022-02-07 | End: 2022-02-07

## 2022-02-07 RX ORDER — SODIUM CHLORIDE 9 MG/ML
1000 INJECTION INTRAMUSCULAR; INTRAVENOUS; SUBCUTANEOUS
Refills: 0 | Status: DISCONTINUED | OUTPATIENT
Start: 2022-02-07 | End: 2022-02-07

## 2022-02-07 RX ADMIN — Medication 10 MILLIGRAM(S): at 15:09

## 2022-02-07 RX ADMIN — ONDANSETRON 4 MILLIGRAM(S): 8 TABLET, FILM COATED ORAL at 02:45

## 2022-02-07 RX ADMIN — ONDANSETRON 4 MILLIGRAM(S): 8 TABLET, FILM COATED ORAL at 11:15

## 2022-02-07 RX ADMIN — SODIUM CHLORIDE 1000 MILLILITER(S): 9 INJECTION INTRAMUSCULAR; INTRAVENOUS; SUBCUTANEOUS at 02:46

## 2022-02-07 RX ADMIN — PANTOPRAZOLE SODIUM 40 MILLIGRAM(S): 20 TABLET, DELAYED RELEASE ORAL at 14:00

## 2022-02-07 RX ADMIN — Medication 40 MILLIEQUIVALENT(S): at 07:34

## 2022-02-07 RX ADMIN — HALOPERIDOL DECANOATE 2.5 MILLIGRAM(S): 100 INJECTION INTRAMUSCULAR at 04:42

## 2022-02-07 RX ADMIN — ONDANSETRON 4 MILLIGRAM(S): 8 TABLET, FILM COATED ORAL at 17:14

## 2022-02-07 RX ADMIN — FAMOTIDINE 20 MILLIGRAM(S): 10 INJECTION INTRAVENOUS at 02:46

## 2022-02-07 RX ADMIN — Medication 100 MILLIGRAM(S): at 18:28

## 2022-02-07 RX ADMIN — SODIUM CHLORIDE 1000 MILLILITER(S): 9 INJECTION INTRAMUSCULAR; INTRAVENOUS; SUBCUTANEOUS at 04:42

## 2022-02-07 RX ADMIN — Medication 272 MILLIGRAM(S): at 14:50

## 2022-02-07 RX ADMIN — SODIUM CHLORIDE 100 MILLILITER(S): 9 INJECTION INTRAMUSCULAR; INTRAVENOUS; SUBCUTANEOUS at 09:51

## 2022-02-07 RX ADMIN — Medication 10 MILLIGRAM(S): at 03:14

## 2022-02-07 NOTE — H&P ADULT - COMMENTS
Reproductive: , one sexual partner, denies history of STIs, LMP mid 2022, Nexplanon removed 1 week ago, now on Estarylla 0.25 mg PO qd x1 week

## 2022-02-07 NOTE — ED PROVIDER NOTE - MDM ORDERS SUBMITTED SELECTION
Informed patient of the note below. Patient verified understanding with no further questions.     Labs/EKG/Imaging Studies/Medications

## 2022-02-07 NOTE — DISCHARGE NOTE PROVIDER - NSDCCPCAREPLAN_GEN_ALL_CORE_FT
PRINCIPAL DISCHARGE DIAGNOSIS  Diagnosis: Nausea and vomiting  Assessment and Plan of Treatment: Continue medications. Follow up with your PCP for further evaluation and management. Please call to make an appointment within 1-2 weeks of discharge.        SECONDARY DISCHARGE DIAGNOSES  Diagnosis: Abdominal pain  Assessment and Plan of Treatment: Continue medications. Follow up with your PCP for further evaluation and management. Please call to make an appointment within 1-2 weeks of discharge.      Diagnosis: UTI (urinary tract infection)  Assessment and Plan of Treatment:

## 2022-02-07 NOTE — CHART NOTE - NSCHARTNOTEFT_GEN_A_CORE
Came to ED to speak to patient about her decision to leave against medical advice.  I had stepped away to look something up and in the interim she left the hospital without the opportunity to explain the risks and benefits of leaving against medical advice.

## 2022-02-07 NOTE — DISCHARGE NOTE PROVIDER - PROVIDER TOKENS
FREE:[LAST:[unknown],PHONE:[(   )    -],FAX:[(   )    -],ADDRESS:[pt left [facility] without knowing who her pcp is]]

## 2022-02-07 NOTE — ED ADULT TRIAGE NOTE - CHIEF COMPLAINT QUOTE
Pt c/o generalized abdominal pain and nausea/vomiting that started 4 days ago, states vomiting and pain returned tonight. States she was seen in ED on 2/6 and was discharged with gastritis. Appears comfortable, not actively vomiting in triage. Pt c/o generalized abdominal pain and nausea/vomiting that started 4 days ago, states vomiting and pain returned tonight. Seen in ED on 2/6 and discharged with gastritis. Appears comfortable, not actively vomiting in triage.

## 2022-02-07 NOTE — H&P ADULT - NSICDXPASTMEDICALHX_GEN_ALL_CORE_FT
PAST MEDICAL HISTORY:  Bipolar affect, depressed     Depression     History of pancreatitis age 14, admitted for hospital x2 weeks, no surgery or follow up    Self mutilating behavior     Suicide attempt

## 2022-02-07 NOTE — H&P ADULT - SKIN
detailed exam multiple 1cm bruises bilateral lower extremities, first noticed by patient yesterday/warm and dry

## 2022-02-07 NOTE — ED PROVIDER NOTE - NS ED ROS FT
ROS:  GENERAL: No fever, no chills  EYES: no change in vision  HEENT: no trouble swallowing, no trouble speaking  CARDIAC: no chest pain  PULMONARY: no cough, no shortness of breath  GI: +abdominal pain,+ vomiting, no diarrhea, no constipation  : No dysuria, no frequency, no change in appearance, or odor of urine  SKIN: no rashes  NEURO: no headache, no weakness  MSK: No joint pain GENERAL: No fever, no chills  EYES: no change in vision  HEENT: no trouble swallowing, no trouble speaking  CARDIAC: no chest pain  PULMONARY: no cough, no shortness of breath  GI: +abdominal pain,+ vomiting, no diarrhea, no constipation  : No dysuria, no frequency, no change in appearance, or odor of urine  SKIN: no rashes  NEURO: no headache, no weakness  MSK: No joint pain

## 2022-02-07 NOTE — ED PROVIDER NOTE - CLINICAL SUMMARY MEDICAL DECISION MAKING FREE TEXT BOX
21f presents to the ED with intractable epigastric pain and vomiting-improved transiently after treatment in ED and then recurred. No new symptoms. Patient appears dry, tender epigastric/periumbilical regions-neg us ruq yesterday, will check labs, ct a/p, hydrate, symptomatic treatment-eval for elec disturbance, organ dysfunction, intraabd infection, pancreatitis-suspect more likely gastric related (ie gastritis exacerbated by etoh), reass pending w/u and treatment. 21f presents to the ED with intractable epigastric pain and vomiting-improved transiently after treatment in ED and then recurred. No new symptoms. Patient appears dry, tender epigastric/periumbilical regions-neg us ruq yesterday, will check labs, ct a/p, hydrate, symptomatic treatment-eval for elec disturbance, organ dysfunction, intra-abd infection, pancreatitis-suspect more likely gastric related (ie gastritis exacerbated by etoh), reass pending w/u and treatment.

## 2022-02-07 NOTE — ED PROVIDER NOTE - PHYSICAL EXAMINATION
Gen: In moderate distress. A&Ox4. Non-toxic appearing.  HEENT: Normocephalic and atraumatic. PERRL, EOMI, no nasal discharge, mucous membranes dry, no scleral icterus.  CV: Regular rate and rhythm, +S1/S2, no M/R/G. No significant lower extremity edema. Radial and DP pulses present and symmetrical. Capillary refill less than 2 seconds.  Resp: Normal effort and rate. CTAB, no rales, rhonchi, or wheezes.  GI: Abdomen soft, non-distended, TTP in epigastric area and mid-lower abd. No masses appreciated. Bowel sounds present.  MSK: No open wounds and no bruising. No CVAT bilaterally.  Neuro: Following commands, speaking in full sentences, moving extremities spontaneously  Psych: Appropriate mood, cooperative

## 2022-02-07 NOTE — ED ADULT NURSE NOTE - CHIEF COMPLAINT QUOTE
Pt c/o generalized abdominal pain and nausea/vomiting that started 4 days ago, states vomiting and pain returned tonight. Seen in ED on 2/6 and discharged with gastritis. Appears comfortable, not actively vomiting in triage.

## 2022-02-07 NOTE — H&P ADULT - ATTENDING COMMENTS
21y female with a PMHx of Bipolar do, Anxiety, Depression, ETOH/ Marijuana use, pancreatitis at 13 yo, recently seen in ED on 2/6 for Gastritis & UTI return to ED due to inability to tolerate PO, nausea, NBNB emesis and epigastric abd pain. Currently reports abd pain 2/10, nausea has improved and would like trial of CLD. VSS. PE noted for . Bloodwork showed mildly elev lipase, mild hypokalemia, nl transaminases, otherwise unremarkable. CT A/P showed no e/o pancreatitis, although showed heterogenous enhancement of inferior R. hepatic lobe could be fibrosis vs inflammatory.     Pt is admitted for alcoholic gastritis. c/w IVF, IV zofran, acetaminophen for pain control, ADAT. No evidence of etoh withdrawal, will cont to monitor on symptom triggered CIWA. c/w macrobid for UTI from prior ED visit. 21y female with a PMHx of Bipolar do, Anxiety, Depression, ETOH/ Marijuana use, pancreatitis at 13 yo, recently seen in ED on 2/6 for Gastritis & UTI return to ED due to inability to tolerate PO, nausea, NBNB emesis and epigastric abd pain. Reports last drink was 2/4, smokes 1 joint intermittently, denies hx of cyclical vomiting syndrome, has remote hx of etoh withdrawal. Currently reports abd pain 2/10, nausea has improved and would like trial of CLD. VSS. PE noted for non toxic appearing, thin female, lungs CTABl, nl S1,S2, diffuse abd tenderness to mild palpation, dry oral mucosa, no LE edema, AAO x 3. Bloodwork showed mildly elev lipase, mild hypokalemia, nl transaminases, otherwise unremarkable. CT A/P showed no e/o pancreatitis, although showed heterogenous enhancement of inferior R. hepatic lobe could be fibrosis vs inflammatory.     Pt is admitted for alcoholic gastritis. c/w IVF, IV zofran, acetaminophen for pain control, ADAT. No evidence of etoh withdrawal, also > 72 hours after last drink. If becomes agitated/clinical deterioration low threshold to start on CIWA protocol. c/w macrobid for UTI from prior ED visit. Will obtain MR abdomen to elucidate further on heterogenous enhancement of inferior R. hepatic lobe found on CT A/P.

## 2022-02-07 NOTE — ED PROVIDER NOTE - ATTENDING CONTRIBUTION TO CARE
GEN - ao3, nontoxic appearing, thin  HEAD - NC/AT   EYES- PERRL, EOMI  ENT: Airway patent, dry mm, Oral cavity and pharynx normal. No inflammation, swelling, exudate, or lesions.    NECK: Neck supple, no masses.  PULMONARY - CTA b/l, symmetric breath sounds.   CARDIAC -s1s2, RRR, no M,G,R  ABDOMEN - +BS, ND, tender to epigastric and periumbilical regions, soft, no guarding, no masses   BACK - no CVA tenderness, Normal  spine   EXTREMITIES - FROM, no edema   SKIN - no rash or bruising   NEUROLOGIC - alert, speech clear, no focal deficits  PSYCH -nl mood/affect, nl insight.  mdm above written by me

## 2022-02-07 NOTE — DISCHARGE NOTE PROVIDER - HOSPITAL COURSE
22 yo F w/ PMHx of ETOH use/ Marijuana use, Pancreatitis, Anxiety, Depression, c/o 4 days of N/V, epigastric/ RUQ abd pain after drinking baileys & fireball whisky. Came to ED on 2/6 given Zofran & macrobid for UTI and sent home, came back last night due to inability to hold PO down.     CT Abd revealed-   1. Heterogeneous enhancement focally involving the inferior right hepatic lobe. Differential considerations include edema (possibly from infectious or inflammatory hepatitis), focal fibrosis, or other unusual etiology.   An  MRI is recommended for further evaluation.  2. No CT findings to suggest acute pancreatitis.  3. Apparent mild thickening of the descending colon, may be artifactual   related to underdistention or related to infectious/inflammatory colitis   22 yo F w/ PMHx of ETOH use/ Marijuana use, Pancreatitis, Anxiety, Depression, c/o 4 days of N/V, epigastric/ RUQ abd pain after drinking baileys & fireball whisky. Came to ED on 2/6 given Zofran & macrobid for UTI and sent home, came back last night due to inability to hold PO down.     CT Abd revealed-   1. Heterogeneous enhancement focally involving the inferior right hepatic lobe. Differential considerations include edema (possibly from infectious or inflammatory hepatitis), focal fibrosis, or other unusual etiology.   An  MRI is recommended for further evaluation.  2. No CT findings to suggest acute pancreatitis.  3. Apparent mild thickening of the descending colon, may be artifactual   related to underdistention or related to infectious/inflammatory colitis      Pt re ports last drink was 2/4, smokes 1 joint intermittently, denies hx of cyclical vomiting syndrome, has remote hx of etoh withdrawal. Currently reports abd pain 2/10, nausea has improved and would like trial of CLD. VSS. PE noted for non toxic appearing, thin female, lungs CTABl, nl S1,S2, diffuse abd tenderness to mild palpation, dry oral mucosa, no LE edema, AAO x 3. Bloodwork showed mildly elev lipase, mild hypokalemia, nl transaminases, otherwise unremarkable. CT A/P showed no e/o pancreatitis, although showed heterogenous enhancement of inferior R. hepatic lobe could be fibrosis vs inflammatory.     Pt is admitted for alcoholic gastritis. c/w IVF, IV zofran, acetaminophen for pain control, ADAT. No evidence of etoh withdrawal, also > 72 hours after last drink. If becomes agitated/clinical deterioration low threshold to start on CIWA protocol. c/w macrobid for UTI from prior ED visit. Will obtain MR abdomen to elucidate further on heterogenous enhancement of inferior R. hepatic lobe found on CT A/P.     2/7 - pt refused to get transferred   to the floor without a visitor. She is leaving against medical advice.  Pt left without Risks and benefits being explained to the patient.  IV was removed by RN.

## 2022-02-07 NOTE — H&P ADULT - HISTORY OF PRESENT ILLNESS
21y female with a PMHx of Bipolar do, Anxiety, Depression, ETOH/ Marijuana use, pancreatitis at 13 yo presented to ED on 2/7 at 1:30 am for complaints of continued abdominal pain and N/V x4 days s/p marijuana and alcohol use on 2/4 when she had consumed half a small bottle of Baileys and a few shots of Fireball whisky. Was previously seen in ED on 2/6 for the same complaints, her abdominal pain was a 4/10 at the time, sent home on Zofran and Macrobid for incidental UTI. She has not taken Macrobid and has vomited the single dose of Zofran she had taken since discharge. Has not consumed alcohol, eaten a meal or smoked marijuana since discharge. Upon return last night she feels that her pain had increased to a 7/10.  Patient states she has vomited "foam" and bile 15-20 times since last night but her pain has now decreased to a 2/10, only hurting when she is nauseous and/or throwing up. The pain is in the midline epigastric radiating to the RUQ and chest, described as burning, pressure and feeling like acid is "stuck" in the chest. Associated SOB & dry cough beofre vomiting and also left sided stomach spasms and right lower back spasms after vomiting episodes. Her symptoms are made better with a hot shower or curling up on her side, made worse when she eats, vomits each time she falls asleep. Last meal 4 days ago, last BM 4 days ago. LMP mid January 2022. Patient does not have a PCP or GI doctor.

## 2022-02-07 NOTE — H&P ADULT - NSICDXFAMILYHX_GEN_ALL_CORE_FT
FAMILY HISTORY:  Mother  Still living? Yes, Estimated age: 43  Family hx of hypertension, Age at diagnosis: Age Unknown

## 2022-02-07 NOTE — H&P ADULT - ASSESSMENT
21y female with a PMHx of Bipolar do, Anxiety, Depression, ETOH/ Marijuana use, pancreatitis at 13 yo, recently seen in ED on 2/6 for Gastritis & UTI presented back to ED last night for complaints of continued abdominal pain and N/V, inability to hold PO down x4 days s/p marijuana and alcohol use on 2/4.  CT Abd revealed- 1. Heterogeneous enhancement focally involving the inferior right hepatic lobe. Differential considerations include edema (possibly from infectious or inflammatory hepatitis), focal fibrosis, or other unusual etiology. An MRI is recommended for further evaluation.  2. No CT findings to suggest acute pancreatitis.  3. Apparent mild thickening of the descending colon, may be artifactual related to underdistention or related to infectious/inflammatory colitis  EKG- NSR @ 65 TWI v3   21y female with a PMHx of Bipolar do, Anxiety, Depression, ETOH/ Marijuana use, pancreatitis at 13 yo, recently seen in ED on 2/6 for Gastritis & UTI presented back to ED last night for complaints of continued abdominal pain and N/V, inability to hold PO down x4 days s/p marijuana and alcohol use on 2/4.  CT Abd revealed- 1. Heterogeneous enhancement focally involving the inferior right hepatic lobe. Differential considerations include edema (possibly from infectious or inflammatory hepatitis), focal fibrosis, or other unusual etiology.   2. No CT findings to suggest acute pancreatitis.  3. Apparent mild thickening of the descending colon, may be artifactual related to underdistention or related to infectious/inflammatory colitis  EKG- NSR @ 65 TWI v3

## 2022-02-07 NOTE — ED PROVIDER NOTE - OBJECTIVE STATEMENT
The patient is a 21y Female with pmhx of ETOH use, pancreatitis, anxiety, depression complaining of 4 days of intractable vomiting s/p alcohol use and marijuana use, associated with epigastric abdominal pain. Reports first episode of vomiting had bright red chunks and streaks after drinking red Pedialyte but no blood noted on other episodes. Pt was at [facility] ED yesterday, had blood-work done including lipase, cbc, cmp, which were unremarkable. RUQ US and CXR were done, which were also unremarkable. Pt was discharged home with diagnosis of gastritis, given Zofran prescription, told to followup with GI doctor. Pt returning to [facility] ED today with worsening vomiting, epigastric abd pain, inability tot tolerate PO, and lower abd pain. Passing gas, last BM 3 days ago. Denies fever, diarrhea, SOB, headache, dizziness. LMP 2 weeks ago. On OCPs. Denies suicidal ideations or ingestions. Denies drinking alcohol or ingesting marijuana since discharge yesterday. NKDA.

## 2022-02-07 NOTE — DISCHARGE NOTE PROVIDER - NSDCMRMEDTOKEN_GEN_ALL_CORE_FT
ARIPiprazole 10 mg oral tablet: 1 tab(s) orally once a day (at bedtime)  birth control pills:   Macrobid 100 mg oral capsule: 1 cap(s) orally 2 times a day   Zofran 4 mg oral tablet: 1 tab(s) orally every 8 hours

## 2022-02-07 NOTE — H&P ADULT - NSHPSOCIALHISTORY_GEN_ALL_CORE
Working as [employer] technician. Admits to 4-6 drinks of hard liquor a week, describes self as "casual drinker". Admits to daily nicotine use 1 cartridge a day and marijuana use 1 joint a day x4-5 years. Admits to a history of cocaine and roberto carlos use at age 14, no longer uses.

## 2022-02-07 NOTE — DISCHARGE NOTE PROVIDER - CARE PROVIDER_API CALL
unknown,   pt left [facility] without knowing who her pcp is  Phone: (   )    -  Fax: (   )    -  Follow Up Time:

## 2022-02-07 NOTE — ED ADULT NURSE NOTE - OBJECTIVE STATEMENT
Facilitator RN - Pt. received in room 24, A&Ox4, ambulatory. PMHx: ETOH abuse, pancreatitis, depression, anxiety c/o 4 days of vomiting and generalized abd pain after using marijuana yesterday. Was seen in ED yesterday and discharged home with zofran prescription. Denies diarrhea, fever, dizziness, SOB, headache, suicide ideation, homicide ideation, use of alcohol today. 20G IV inserted in right forearm, positive blood return, flushes without difficulty. Respirations even & unlabored on room air. Calm and cooperative. Report given to primary RN.

## 2023-09-18 NOTE — ED ADULT TRIAGE NOTE - RESPIRATORY RATE (BREATHS/MIN)
16
Render Risk Assessment In Note?: no
Detail Level: Simple
Additional Notes: Will schedule another tx for mainly scalp and upper face.

## 2024-08-30 NOTE — ED ADULT TRIAGE NOTE - SOURCE OF INFORMATION
Reviewed INR results with Omaira Bullock NP.  Maintain a consistent diet.  Next INR 9/13/24.  Notified patient via live well.    
Patient
